# Patient Record
Sex: MALE | Race: WHITE | NOT HISPANIC OR LATINO | Employment: UNEMPLOYED | URBAN - METROPOLITAN AREA
[De-identification: names, ages, dates, MRNs, and addresses within clinical notes are randomized per-mention and may not be internally consistent; named-entity substitution may affect disease eponyms.]

---

## 2023-05-09 DIAGNOSIS — Z72.0 TOBACCO ABUSE: Primary | ICD-10-CM

## 2023-05-09 RX ORDER — MICONAZOLE NITRATE 2 %
CREAM (GRAM) TOPICAL
Qty: 30 EACH | Refills: 1 | Status: SHIPPED | OUTPATIENT
Start: 2023-05-09 | End: 2023-06-07

## 2023-06-28 ENCOUNTER — TELEPHONE (OUTPATIENT)
Dept: PRIMARY CARE | Facility: CLINIC | Age: 53
End: 2023-06-28

## 2023-08-29 PROCEDURE — 99222 1ST HOSP IP/OBS MODERATE 55: CPT | Performed by: INTERNAL MEDICINE

## 2023-08-30 PROCEDURE — 99232 SBSQ HOSP IP/OBS MODERATE 35: CPT | Performed by: INTERNAL MEDICINE

## 2023-08-31 PROCEDURE — 99232 SBSQ HOSP IP/OBS MODERATE 35: CPT | Performed by: INTERNAL MEDICINE

## 2023-09-01 PROCEDURE — 99238 HOSP IP/OBS DSCHRG MGMT 30/<: CPT | Performed by: INTERNAL MEDICINE

## 2023-10-02 ENCOUNTER — PHARMACY VISIT (OUTPATIENT)
Dept: PHARMACY | Facility: CLINIC | Age: 53
End: 2023-10-02
Payer: COMMERCIAL

## 2023-10-02 PROCEDURE — RXMED WILLOW AMBULATORY MEDICATION CHARGE

## 2023-10-10 ENCOUNTER — PATIENT OUTREACH (OUTPATIENT)
Dept: CASE MANAGEMENT | Facility: HOSPITAL | Age: 53
End: 2023-10-10

## 2023-10-10 NOTE — PROGRESS NOTES
Spoke with Cesario, reports that he is doing his best to manage HF. Follows low Na diet, takes medications as directed. Verbalizes understanding of HF medications & HF flare up symptoms to report to MD. No readmits for HF in past 39 days. Closing HF case today.

## 2023-10-24 ENCOUNTER — APPOINTMENT (OUTPATIENT)
Dept: PRIMARY CARE | Facility: CLINIC | Age: 53
End: 2023-10-24

## 2023-11-04 ENCOUNTER — PHARMACY VISIT (OUTPATIENT)
Dept: PHARMACY | Facility: CLINIC | Age: 53
End: 2023-11-04

## 2023-11-04 PROCEDURE — RXMED WILLOW AMBULATORY MEDICATION CHARGE

## 2023-11-30 ENCOUNTER — PHARMACY VISIT (OUTPATIENT)
Dept: PHARMACY | Facility: CLINIC | Age: 53
End: 2023-11-30
Payer: MEDICAID

## 2023-11-30 PROCEDURE — RXMED WILLOW AMBULATORY MEDICATION CHARGE

## 2023-12-01 DIAGNOSIS — Z72.0 TOBACCO ABUSE: ICD-10-CM

## 2023-12-01 RX ORDER — MICONAZOLE NITRATE 2 %
CREAM (GRAM) TOPICAL
Qty: 50 EACH | Refills: 1 | Status: SHIPPED | OUTPATIENT
Start: 2023-12-01 | End: 2024-06-07 | Stop reason: SDUPTHER

## 2023-12-02 ENCOUNTER — PHARMACY VISIT (OUTPATIENT)
Dept: PHARMACY | Facility: CLINIC | Age: 53
End: 2023-12-02
Payer: MEDICAID

## 2023-12-02 PROCEDURE — RXMED WILLOW AMBULATORY MEDICATION CHARGE

## 2024-01-08 PROCEDURE — RXMED WILLOW AMBULATORY MEDICATION CHARGE

## 2024-01-09 ENCOUNTER — TELEPHONE (OUTPATIENT)
Dept: PRIMARY CARE | Facility: CLINIC | Age: 54
End: 2024-01-09
Payer: MEDICAID

## 2024-01-19 ENCOUNTER — PHARMACY VISIT (OUTPATIENT)
Dept: PHARMACY | Facility: CLINIC | Age: 54
End: 2024-01-19
Payer: MEDICAID

## 2024-01-19 PROCEDURE — RXMED WILLOW AMBULATORY MEDICATION CHARGE

## 2024-01-19 RX ORDER — SERTRALINE HYDROCHLORIDE 50 MG/1
50 TABLET, FILM COATED ORAL DAILY
Qty: 30 TABLET | Refills: 1 | OUTPATIENT
Start: 2024-01-19 | End: 2025-01-18

## 2024-01-23 ENCOUNTER — PHARMACY VISIT (OUTPATIENT)
Dept: PHARMACY | Facility: CLINIC | Age: 54
End: 2024-01-23
Payer: MEDICAID

## 2024-06-07 ENCOUNTER — OFFICE VISIT (OUTPATIENT)
Dept: PRIMARY CARE | Facility: CLINIC | Age: 54
End: 2024-06-07
Payer: MEDICAID

## 2024-06-07 VITALS
DIASTOLIC BLOOD PRESSURE: 98 MMHG | HEIGHT: 70 IN | BODY MASS INDEX: 26.34 KG/M2 | WEIGHT: 184 LBS | SYSTOLIC BLOOD PRESSURE: 158 MMHG

## 2024-06-07 DIAGNOSIS — Z13.220 LIPID SCREENING: ICD-10-CM

## 2024-06-07 DIAGNOSIS — Z72.0 TOBACCO ABUSE: ICD-10-CM

## 2024-06-07 DIAGNOSIS — I50.9 CONGESTIVE HEART FAILURE, UNSPECIFIED HF CHRONICITY, UNSPECIFIED HEART FAILURE TYPE (MULTI): ICD-10-CM

## 2024-06-07 DIAGNOSIS — R53.83 OTHER FATIGUE: ICD-10-CM

## 2024-06-07 DIAGNOSIS — I10 HYPERTENSION, UNSPECIFIED TYPE: ICD-10-CM

## 2024-06-07 DIAGNOSIS — I63.9 CEREBROVASCULAR ACCIDENT (CVA), UNSPECIFIED MECHANISM (MULTI): ICD-10-CM

## 2024-06-07 DIAGNOSIS — E78.00 HIGH CHOLESTEROL: ICD-10-CM

## 2024-06-07 DIAGNOSIS — E55.9 VITAMIN D DEFICIENCY: ICD-10-CM

## 2024-06-07 DIAGNOSIS — R42 DIZZINESS: Primary | ICD-10-CM

## 2024-06-07 PROBLEM — F41.9 ANXIETY: Status: ACTIVE | Noted: 2024-06-07

## 2024-06-07 PROBLEM — M19.90 ARTHRITIS: Status: ACTIVE | Noted: 2024-06-07

## 2024-06-07 PROBLEM — H92.02 EARACHE ON LEFT: Status: ACTIVE | Noted: 2024-06-07

## 2024-06-07 PROBLEM — I42.0 CONGESTIVE CARDIOMYOPATHY (MULTI): Status: ACTIVE | Noted: 2024-06-07

## 2024-06-07 PROBLEM — J18.9 ATYPICAL PNEUMONIA: Status: ACTIVE | Noted: 2024-06-07

## 2024-06-07 PROBLEM — B19.20 HEPATITIS C VIRUS: Status: ACTIVE | Noted: 2024-06-07

## 2024-06-07 PROBLEM — B37.0 ORAL CANDIDIASIS: Status: ACTIVE | Noted: 2024-06-07

## 2024-06-07 PROBLEM — I35.1 AORTIC VALVE REGURGITATION: Status: ACTIVE | Noted: 2024-06-07

## 2024-06-07 PROBLEM — N28.9 KIDNEY LESION: Status: ACTIVE | Noted: 2024-06-07

## 2024-06-07 PROBLEM — K13.0 ANGULAR CHEILITIS: Status: ACTIVE | Noted: 2024-06-07

## 2024-06-07 PROBLEM — B36.0 PITYRIASIS VERSICOLOR: Status: ACTIVE | Noted: 2024-06-07

## 2024-06-07 PROBLEM — I34.0 MITRAL VALVE REGURGITATION: Status: ACTIVE | Noted: 2024-06-07

## 2024-06-07 PROBLEM — H93.13 TINNITUS OF BOTH EARS: Status: ACTIVE | Noted: 2024-06-07

## 2024-06-07 PROBLEM — R55 NEAR SYNCOPE: Status: ACTIVE | Noted: 2024-06-07

## 2024-06-07 PROBLEM — D17.0 LIPOMA OF FOREHEAD: Status: ACTIVE | Noted: 2024-06-07

## 2024-06-07 PROBLEM — R09.02 HYPOXIA: Status: ACTIVE | Noted: 2024-06-07

## 2024-06-07 PROBLEM — Z86.59 HISTORY OF ADHD: Status: ACTIVE | Noted: 2024-06-07

## 2024-06-07 PROBLEM — H52.4 BILATERAL PRESBYOPIA: Status: ACTIVE | Noted: 2024-06-07

## 2024-06-07 PROBLEM — R22.0 LUMP ON FACE: Status: ACTIVE | Noted: 2024-06-07

## 2024-06-07 PROBLEM — J20.9 ACUTE BRONCHITIS: Status: ACTIVE | Noted: 2024-06-07

## 2024-06-07 PROBLEM — H17.9 CORNEAL SCAR, RIGHT EYE: Status: ACTIVE | Noted: 2024-06-07

## 2024-06-07 PROBLEM — I81 PORTAL VEIN THROMBOSIS: Status: ACTIVE | Noted: 2024-06-07

## 2024-06-07 PROBLEM — R06.00 DYSPNEA: Status: ACTIVE | Noted: 2024-06-07

## 2024-06-07 PROBLEM — H90.3 SENSORINEURAL HEARING LOSS (SNHL) OF BOTH EARS: Status: ACTIVE | Noted: 2024-06-07

## 2024-06-07 PROBLEM — R05.9 COUGHING: Status: ACTIVE | Noted: 2024-06-07

## 2024-06-07 PROBLEM — I26.99: Status: ACTIVE | Noted: 2024-06-07

## 2024-06-07 PROCEDURE — 99214 OFFICE O/P EST MOD 30 MIN: CPT | Performed by: INTERNAL MEDICINE

## 2024-06-07 PROCEDURE — 3077F SYST BP >= 140 MM HG: CPT | Performed by: INTERNAL MEDICINE

## 2024-06-07 PROCEDURE — RXMED WILLOW AMBULATORY MEDICATION CHARGE

## 2024-06-07 PROCEDURE — 3080F DIAST BP >= 90 MM HG: CPT | Performed by: INTERNAL MEDICINE

## 2024-06-07 RX ORDER — MICONAZOLE NITRATE 2 %
CREAM (GRAM) TOPICAL
Qty: 50 EACH | Refills: 1 | Status: SHIPPED | OUTPATIENT
Start: 2024-06-07 | End: 2024-07-10

## 2024-06-08 NOTE — PROGRESS NOTES
"Subjective   Patient ID: Austin Gould is a 54 y.o. male who presents for Follow-up (Multiple medical issues).    This gentleman today came here for multiple medical issues.  Feeling very tired, fatigued, and exhausted.  He loses memory.  He has dizziness at times.  He came here for follow-up.  He sees cardiologist.  He has a heart failure.    I have personally reviewed the patient's Past Medical History, Medications, Allergies, Social History, and Family History in the EMR.    Review of Systems   All other systems reviewed and are negative.    Objective   BP (!) 158/98   Ht 1.778 m (5' 10\")   Wt 83.5 kg (184 lb)   BMI 26.40 kg/m²     Physical Exam  Vitals reviewed.   Cardiovascular:      Heart sounds: Normal heart sounds, S1 normal and S2 normal. No murmur heard.     No friction rub.   Pulmonary:      Effort: Pulmonary effort is normal.      Breath sounds: Normal breath sounds and air entry.   Abdominal:      Palpations: There is no hepatomegaly, splenomegaly or mass.   Musculoskeletal:      Right lower leg: No edema.      Left lower leg: No edema.   Lymphadenopathy:      Lower Body: No right inguinal adenopathy. No left inguinal adenopathy.   Neurological:      Cranial Nerves: Cranial nerves 2-12 are intact.      Sensory: No sensory deficit.      Motor: Motor function is intact.      Deep Tendon Reflexes: Reflexes are normal and symmetric.     LAB WORK:  Laboratory testing discussed.    Assessment/Plan   Problem List Items Addressed This Visit             ICD-10-CM       Cardiac and Vasculature    CHF (congestive heart failure) (Multi) I50.9    Relevant Orders    Referral to Cardiology       Endocrine/Metabolic    Vitamin D deficiency E55.9    Relevant Orders    Vitamin D 25-Hydroxy,Total (for eval of Vitamin D levels)       Symptoms and Signs    Dizziness - Primary R42     Other Visit Diagnoses         Codes    Lipid screening     Z13.220    Relevant Orders    Comprehensive Metabolic Panel    Lipid Panel    " Other fatigue     R53.83    Relevant Orders    CBC    Vitamin B12    Thyroid Stimulating Hormone    Urinalysis with Reflex Microscopic    Cerebrovascular accident (CVA), unspecified mechanism (Multi)     I63.9    Relevant Orders    MR brain wo IV contrast    Referral to Neurology    Hypertension, unspecified type     I10    High cholesterol     E78.00        1. Dizziness, headache and memory issue.  Refer to Neurology.  Immediate MRI ordered.  It could be multi-infract kind of dementia setting in.  2. Congestive heart failure.  No pacemaker.  I see cardiologist.  3. High blood pressure, okay.  4. High cholesterol.  Monitor.  5. Blood work ordered.  6. I shall see him back in a week after the test.  7. I urged him to bring the medication for review.  8. Make appointment with Neurology and Cardiology.    Scribe Attestation  By signing my name below, I, Sanjuana Serna, Scribe attest that this documentation has been prepared under the direction and in the presence of Parth Couch MD.

## 2024-06-17 ENCOUNTER — LAB (OUTPATIENT)
Dept: LAB | Facility: LAB | Age: 54
End: 2024-06-17
Payer: MEDICAID

## 2024-06-17 DIAGNOSIS — E55.9 VITAMIN D DEFICIENCY: ICD-10-CM

## 2024-06-17 DIAGNOSIS — R53.83 OTHER FATIGUE: ICD-10-CM

## 2024-06-17 DIAGNOSIS — Z13.220 LIPID SCREENING: ICD-10-CM

## 2024-06-17 LAB
ALBUMIN SERPL BCP-MCNC: 4.7 G/DL (ref 3.4–5)
ALP SERPL-CCNC: 70 U/L (ref 33–120)
ALT SERPL W P-5'-P-CCNC: 17 U/L (ref 10–52)
ANION GAP SERPL CALC-SCNC: 14 MMOL/L (ref 10–20)
AST SERPL W P-5'-P-CCNC: 17 U/L (ref 9–39)
BILIRUB SERPL-MCNC: 0.7 MG/DL (ref 0–1.2)
BUN SERPL-MCNC: 12 MG/DL (ref 6–23)
CALCIUM SERPL-MCNC: 9.7 MG/DL (ref 8.6–10.6)
CHLORIDE SERPL-SCNC: 103 MMOL/L (ref 98–107)
CHOLEST SERPL-MCNC: 151 MG/DL (ref 0–199)
CHOLESTEROL/HDL RATIO: 4.4
CO2 SERPL-SCNC: 28 MMOL/L (ref 21–32)
CREAT SERPL-MCNC: 1.16 MG/DL (ref 0.5–1.3)
EGFRCR SERPLBLD CKD-EPI 2021: 76 ML/MIN/1.73M*2
ERYTHROCYTE [DISTWIDTH] IN BLOOD BY AUTOMATED COUNT: 12.3 % (ref 11.5–14.5)
GLUCOSE SERPL-MCNC: 93 MG/DL (ref 74–99)
HCT VFR BLD AUTO: 52.6 % (ref 41–52)
HDLC SERPL-MCNC: 34.1 MG/DL
HGB BLD-MCNC: 17.8 G/DL (ref 13.5–17.5)
LDLC SERPL CALC-MCNC: 84 MG/DL
MCH RBC QN AUTO: 32.8 PG (ref 26–34)
MCHC RBC AUTO-ENTMCNC: 33.8 G/DL (ref 32–36)
MCV RBC AUTO: 97 FL (ref 80–100)
NON HDL CHOLESTEROL: 117 MG/DL (ref 0–149)
NRBC BLD-RTO: 0 /100 WBCS (ref 0–0)
PLATELET # BLD AUTO: 219 X10*3/UL (ref 150–450)
POTASSIUM SERPL-SCNC: 4.2 MMOL/L (ref 3.5–5.3)
PROT SERPL-MCNC: 6.9 G/DL (ref 6.4–8.2)
RBC # BLD AUTO: 5.42 X10*6/UL (ref 4.5–5.9)
SODIUM SERPL-SCNC: 141 MMOL/L (ref 136–145)
TRIGL SERPL-MCNC: 167 MG/DL (ref 0–149)
VLDL: 33 MG/DL (ref 0–40)
WBC # BLD AUTO: 6.7 X10*3/UL (ref 4.4–11.3)

## 2024-06-17 PROCEDURE — 80061 LIPID PANEL: CPT

## 2024-06-17 PROCEDURE — 80053 COMPREHEN METABOLIC PANEL: CPT

## 2024-06-17 PROCEDURE — 82306 VITAMIN D 25 HYDROXY: CPT

## 2024-06-17 PROCEDURE — 81001 URINALYSIS AUTO W/SCOPE: CPT

## 2024-06-17 PROCEDURE — 85027 COMPLETE CBC AUTOMATED: CPT

## 2024-06-17 PROCEDURE — 82607 VITAMIN B-12: CPT

## 2024-06-17 PROCEDURE — 84443 ASSAY THYROID STIM HORMONE: CPT

## 2024-06-17 PROCEDURE — 36415 COLL VENOUS BLD VENIPUNCTURE: CPT

## 2024-06-18 ENCOUNTER — PHARMACY VISIT (OUTPATIENT)
Dept: PHARMACY | Facility: CLINIC | Age: 54
End: 2024-06-18
Payer: MEDICAID

## 2024-06-18 LAB
25(OH)D3 SERPL-MCNC: 30 NG/ML (ref 30–100)
APPEARANCE UR: CLEAR
BILIRUB UR STRIP.AUTO-MCNC: NEGATIVE MG/DL
COLOR UR: YELLOW
GLUCOSE UR STRIP.AUTO-MCNC: NORMAL MG/DL
KETONES UR STRIP.AUTO-MCNC: NEGATIVE MG/DL
LEUKOCYTE ESTERASE UR QL STRIP.AUTO: NEGATIVE
MUCOUS THREADS #/AREA URNS AUTO: NORMAL /LPF
NITRITE UR QL STRIP.AUTO: NEGATIVE
PH UR STRIP.AUTO: 6 [PH]
PROT UR STRIP.AUTO-MCNC: NORMAL MG/DL
RBC # UR STRIP.AUTO: NEGATIVE /UL
RBC #/AREA URNS AUTO: NORMAL /HPF
SP GR UR STRIP.AUTO: 1.02
TSH SERPL-ACNC: 2.66 MIU/L (ref 0.44–3.98)
UROBILINOGEN UR STRIP.AUTO-MCNC: NORMAL MG/DL
VIT B12 SERPL-MCNC: 270 PG/ML (ref 211–911)
WBC #/AREA URNS AUTO: NORMAL /HPF

## 2024-06-24 ENCOUNTER — TELEPHONE (OUTPATIENT)
Dept: PRIMARY CARE | Facility: CLINIC | Age: 54
End: 2024-06-24
Payer: MEDICAID

## 2024-07-01 DIAGNOSIS — Z72.0 TOBACCO ABUSE: ICD-10-CM

## 2024-07-01 PROCEDURE — RXMED WILLOW AMBULATORY MEDICATION CHARGE

## 2024-07-01 RX ORDER — MICONAZOLE NITRATE 2 %
CREAM (GRAM) TOPICAL
Qty: 50 EACH | Refills: 1 | Status: SHIPPED | OUTPATIENT
Start: 2024-07-01 | End: 2024-07-09 | Stop reason: SDUPTHER

## 2024-07-02 ENCOUNTER — PHARMACY VISIT (OUTPATIENT)
Dept: PHARMACY | Facility: CLINIC | Age: 54
End: 2024-07-02
Payer: MEDICAID

## 2024-07-09 DIAGNOSIS — R06.02 SOB (SHORTNESS OF BREATH): Primary | ICD-10-CM

## 2024-07-09 DIAGNOSIS — Z72.0 TOBACCO ABUSE: ICD-10-CM

## 2024-07-09 PROCEDURE — RXMED WILLOW AMBULATORY MEDICATION CHARGE

## 2024-07-09 RX ORDER — MICONAZOLE NITRATE 2 %
CREAM (GRAM) TOPICAL
Qty: 50 EACH | Refills: 1 | Status: SHIPPED | OUTPATIENT
Start: 2024-07-09 | End: 2024-08-08

## 2024-07-09 RX ORDER — ALBUTEROL SULFATE 90 UG/1
2 AEROSOL, METERED RESPIRATORY (INHALATION)
Qty: 6.7 G | Refills: 0 | Status: SHIPPED | OUTPATIENT
Start: 2024-07-09 | End: 2025-07-09

## 2024-07-10 ENCOUNTER — PHARMACY VISIT (OUTPATIENT)
Dept: PHARMACY | Facility: CLINIC | Age: 54
End: 2024-07-10
Payer: MEDICAID

## 2024-07-17 ENCOUNTER — APPOINTMENT (OUTPATIENT)
Dept: RADIOLOGY | Facility: CLINIC | Age: 54
End: 2024-07-17
Payer: MEDICAID

## 2024-07-22 DIAGNOSIS — Z72.0 TOBACCO ABUSE: ICD-10-CM

## 2024-07-22 PROCEDURE — RXMED WILLOW AMBULATORY MEDICATION CHARGE

## 2024-07-22 RX ORDER — MICONAZOLE NITRATE 2 %
CREAM (GRAM) TOPICAL
Qty: 50 EACH | Refills: 1 | Status: SHIPPED | OUTPATIENT
Start: 2024-07-22 | End: 2024-07-31 | Stop reason: SDUPTHER

## 2024-07-23 ENCOUNTER — PHARMACY VISIT (OUTPATIENT)
Dept: PHARMACY | Facility: CLINIC | Age: 54
End: 2024-07-23
Payer: MEDICAID

## 2024-07-31 ENCOUNTER — APPOINTMENT (OUTPATIENT)
Dept: RADIOLOGY | Facility: CLINIC | Age: 54
End: 2024-07-31
Payer: MEDICAID

## 2024-07-31 DIAGNOSIS — Z72.0 TOBACCO ABUSE: ICD-10-CM

## 2024-07-31 PROCEDURE — RXMED WILLOW AMBULATORY MEDICATION CHARGE

## 2024-07-31 RX ORDER — MICONAZOLE NITRATE 2 %
CREAM (GRAM) TOPICAL
Qty: 50 EACH | Refills: 1 | Status: SHIPPED | OUTPATIENT
Start: 2024-07-31 | End: 2024-08-30

## 2024-08-03 ENCOUNTER — PHARMACY VISIT (OUTPATIENT)
Dept: PHARMACY | Facility: CLINIC | Age: 54
End: 2024-08-03
Payer: MEDICAID

## 2024-08-09 ENCOUNTER — LAB (OUTPATIENT)
Dept: LAB | Facility: LAB | Age: 54
End: 2024-08-09
Payer: MEDICAID

## 2024-08-09 ENCOUNTER — OFFICE VISIT (OUTPATIENT)
Dept: PRIMARY CARE | Facility: CLINIC | Age: 54
End: 2024-08-09
Payer: MEDICAID

## 2024-08-09 VITALS
BODY MASS INDEX: 25.91 KG/M2 | SYSTOLIC BLOOD PRESSURE: 142 MMHG | WEIGHT: 181 LBS | DIASTOLIC BLOOD PRESSURE: 84 MMHG | HEIGHT: 70 IN

## 2024-08-09 DIAGNOSIS — F32.A ANXIETY AND DEPRESSION: ICD-10-CM

## 2024-08-09 DIAGNOSIS — I10 HYPERTENSION, UNSPECIFIED TYPE: ICD-10-CM

## 2024-08-09 DIAGNOSIS — R06.02 SOB (SHORTNESS OF BREATH): ICD-10-CM

## 2024-08-09 DIAGNOSIS — R53.83 OTHER FATIGUE: ICD-10-CM

## 2024-08-09 DIAGNOSIS — F41.9 ANXIETY AND DEPRESSION: ICD-10-CM

## 2024-08-09 DIAGNOSIS — R60.9 EDEMA, UNSPECIFIED TYPE: ICD-10-CM

## 2024-08-09 DIAGNOSIS — K21.9 GASTROESOPHAGEAL REFLUX DISEASE, UNSPECIFIED WHETHER ESOPHAGITIS PRESENT: ICD-10-CM

## 2024-08-09 DIAGNOSIS — I50.9 CONGESTIVE HEART FAILURE, UNSPECIFIED HF CHRONICITY, UNSPECIFIED HEART FAILURE TYPE (MULTI): Primary | ICD-10-CM

## 2024-08-09 PROBLEM — Z95.0 PRESENCE OF CARDIAC PACEMAKER: Status: ACTIVE | Noted: 2024-08-09

## 2024-08-09 PROBLEM — R20.2 NUMBNESS AND TINGLING SENSATION OF SKIN: Status: ACTIVE | Noted: 2024-08-09

## 2024-08-09 PROBLEM — R25.2 CRAMPING OF HANDS: Status: ACTIVE | Noted: 2024-08-09

## 2024-08-09 PROBLEM — E66.3 OVERWEIGHT WITH BODY MASS INDEX (BMI) 25.0-29.9: Status: ACTIVE | Noted: 2024-08-09

## 2024-08-09 PROBLEM — R20.0 NUMBNESS AND TINGLING SENSATION OF SKIN: Status: ACTIVE | Noted: 2024-08-09

## 2024-08-09 LAB
ALBUMIN SERPL BCP-MCNC: 4.9 G/DL (ref 3.4–5)
ALP SERPL-CCNC: 62 U/L (ref 33–120)
ALT SERPL W P-5'-P-CCNC: 21 U/L (ref 10–52)
ANION GAP SERPL CALC-SCNC: 17 MMOL/L (ref 10–20)
AST SERPL W P-5'-P-CCNC: 25 U/L (ref 9–39)
BILIRUB SERPL-MCNC: 1 MG/DL (ref 0–1.2)
BUN SERPL-MCNC: 19 MG/DL (ref 6–23)
CALCIUM SERPL-MCNC: 9.7 MG/DL (ref 8.6–10.6)
CHLORIDE SERPL-SCNC: 104 MMOL/L (ref 98–107)
CHOLEST SERPL-MCNC: 128 MG/DL (ref 0–199)
CHOLESTEROL/HDL RATIO: 2.6
CO2 SERPL-SCNC: 25 MMOL/L (ref 21–32)
CREAT SERPL-MCNC: 1.22 MG/DL (ref 0.5–1.3)
EGFRCR SERPLBLD CKD-EPI 2021: 71 ML/MIN/1.73M*2
ERYTHROCYTE [DISTWIDTH] IN BLOOD BY AUTOMATED COUNT: 12.2 % (ref 11.5–14.5)
GLUCOSE SERPL-MCNC: 93 MG/DL (ref 74–99)
HCT VFR BLD AUTO: 45.7 % (ref 41–52)
HDLC SERPL-MCNC: 49.8 MG/DL
HGB BLD-MCNC: 15.7 G/DL (ref 13.5–17.5)
MCH RBC QN AUTO: 32.8 PG (ref 26–34)
MCHC RBC AUTO-ENTMCNC: 34.4 G/DL (ref 32–36)
MCV RBC AUTO: 96 FL (ref 80–100)
NON-HDL CHOLESTEROL: 78 MG/DL (ref 0–149)
NRBC BLD-RTO: 0 /100 WBCS (ref 0–0)
PLATELET # BLD AUTO: 175 X10*3/UL (ref 150–450)
POTASSIUM SERPL-SCNC: 4.2 MMOL/L (ref 3.5–5.3)
PROT SERPL-MCNC: 6.7 G/DL (ref 6.4–8.2)
RBC # BLD AUTO: 4.78 X10*6/UL (ref 4.5–5.9)
SODIUM SERPL-SCNC: 142 MMOL/L (ref 136–145)
TSH SERPL-ACNC: 3.07 MIU/L (ref 0.44–3.98)
WBC # BLD AUTO: 7.1 X10*3/UL (ref 4.4–11.3)

## 2024-08-09 PROCEDURE — RXMED WILLOW AMBULATORY MEDICATION CHARGE

## 2024-08-09 PROCEDURE — 80053 COMPREHEN METABOLIC PANEL: CPT

## 2024-08-09 PROCEDURE — 3079F DIAST BP 80-89 MM HG: CPT | Performed by: INTERNAL MEDICINE

## 2024-08-09 PROCEDURE — 82465 ASSAY BLD/SERUM CHOLESTEROL: CPT

## 2024-08-09 PROCEDURE — 36415 COLL VENOUS BLD VENIPUNCTURE: CPT

## 2024-08-09 PROCEDURE — 84443 ASSAY THYROID STIM HORMONE: CPT

## 2024-08-09 PROCEDURE — 3008F BODY MASS INDEX DOCD: CPT | Performed by: INTERNAL MEDICINE

## 2024-08-09 PROCEDURE — 83718 ASSAY OF LIPOPROTEIN: CPT

## 2024-08-09 PROCEDURE — 3077F SYST BP >= 140 MM HG: CPT | Performed by: INTERNAL MEDICINE

## 2024-08-09 PROCEDURE — 85027 COMPLETE CBC AUTOMATED: CPT

## 2024-08-09 PROCEDURE — 99214 OFFICE O/P EST MOD 30 MIN: CPT | Performed by: INTERNAL MEDICINE

## 2024-08-09 PROCEDURE — 81001 URINALYSIS AUTO W/SCOPE: CPT

## 2024-08-09 RX ORDER — ALBUTEROL SULFATE 90 UG/1
2 INHALANT RESPIRATORY (INHALATION)
Qty: 6.7 G | Refills: 0 | Status: SHIPPED | OUTPATIENT
Start: 2024-08-09 | End: 2025-08-09

## 2024-08-09 RX ORDER — FUROSEMIDE 40 MG/1
40 TABLET ORAL DAILY
Qty: 30 TABLET | Refills: 6 | Status: SHIPPED | OUTPATIENT
Start: 2024-08-09 | End: 2025-08-09

## 2024-08-09 RX ORDER — ASPIRIN 81 MG/1
81 TABLET ORAL DAILY
Qty: 30 TABLET | Refills: 0 | Status: SHIPPED | OUTPATIENT
Start: 2024-08-09 | End: 2025-08-09

## 2024-08-09 RX ORDER — POTASSIUM CHLORIDE 20 MEQ/1
TABLET, EXTENDED RELEASE ORAL
Qty: 30 TABLET | Refills: 6 | Status: SHIPPED | OUTPATIENT
Start: 2024-08-09 | End: 2025-08-09

## 2024-08-09 RX ORDER — SERTRALINE HYDROCHLORIDE 50 MG/1
50 TABLET, FILM COATED ORAL DAILY
Qty: 30 TABLET | Refills: 1 | Status: SHIPPED | OUTPATIENT
Start: 2024-08-09 | End: 2025-08-09

## 2024-08-09 RX ORDER — CARVEDILOL 25 MG/1
25 TABLET ORAL
Qty: 60 TABLET | Refills: 6 | Status: SHIPPED | OUTPATIENT
Start: 2024-08-09 | End: 2025-08-09

## 2024-08-09 RX ORDER — SPIRONOLACTONE 25 MG/1
25 TABLET ORAL DAILY
Qty: 30 TABLET | Refills: 6 | Status: SHIPPED | OUTPATIENT
Start: 2024-08-09 | End: 2025-08-09

## 2024-08-10 LAB
AMORPH CRY #/AREA UR COMP ASSIST: ABNORMAL /HPF
APPEARANCE UR: ABNORMAL
BILIRUB UR STRIP.AUTO-MCNC: NEGATIVE MG/DL
COLOR UR: ABNORMAL
GLUCOSE UR STRIP.AUTO-MCNC: NORMAL MG/DL
KETONES UR STRIP.AUTO-MCNC: NEGATIVE MG/DL
LEUKOCYTE ESTERASE UR QL STRIP.AUTO: NEGATIVE
MUCOUS THREADS #/AREA URNS AUTO: ABNORMAL /LPF
NITRITE UR QL STRIP.AUTO: NEGATIVE
PH UR STRIP.AUTO: 5.5 [PH]
PROT UR STRIP.AUTO-MCNC: ABNORMAL MG/DL
RBC # UR STRIP.AUTO: NEGATIVE /UL
RBC #/AREA URNS AUTO: ABNORMAL /HPF
SP GR UR STRIP.AUTO: 1.03
SQUAMOUS #/AREA URNS AUTO: ABNORMAL /HPF
UROBILINOGEN UR STRIP.AUTO-MCNC: NORMAL MG/DL
WBC #/AREA URNS AUTO: ABNORMAL /HPF

## 2024-08-10 NOTE — PROGRESS NOTES
"Subjective   Patient ID: Austin Gould is a 54 y.o. male who presents for Follow-up and Terminal heart disease.    This gentleman today came here for follow-up on various conditions.  He told me that he knows he has terminal heart disease, but he stopped taking all medications.  He is not smoking.  He chews tobacco.    I have personally reviewed the patient's Past Medical History, Medications, Allergies, Social History, and Family History in the EMR.    Review of Systems   All other systems reviewed and are negative.    Objective   /84   Ht 1.778 m (5' 10\")   Wt 82.1 kg (181 lb)   BMI 25.97 kg/m²     Physical Exam  Vitals reviewed.   Cardiovascular:      Heart sounds: Normal heart sounds, S1 normal and S2 normal. No murmur heard.     No friction rub.   Pulmonary:      Effort: Pulmonary effort is normal.      Breath sounds: Wheezing (bilateral) present.   Abdominal:      Palpations: There is no hepatomegaly, splenomegaly or mass.   Musculoskeletal:      Right lower leg: No edema.      Left lower leg: No edema.   Lymphadenopathy:      Lower Body: No right inguinal adenopathy. No left inguinal adenopathy.   Neurological:      Cranial Nerves: Cranial nerves 2-12 are intact.      Sensory: No sensory deficit.      Motor: Motor function is intact.      Deep Tendon Reflexes: Reflexes are normal and symmetric.     LAB WORK: Laboratory testing discussed.    Assessment/Plan   Problem List Items Addressed This Visit             ICD-10-CM       Cardiac and Vasculature    CHF (congestive heart failure) (Multi) - Primary I50.9    Relevant Medications    sacubitriL-valsartan (Entresto) 24-26 mg tablet    carvedilol (Coreg) 25 mg tablet     Other Visit Diagnoses         Codes    SOB (shortness of breath)     R06.02    Relevant Medications    potassium chloride CR (Klor-Con M20) 20 mEq ER tablet    sacubitriL-valsartan (Entresto) 24-26 mg tablet    albuterol 90 mcg/actuation inhaler    carvedilol (Coreg) 25 mg tablet    " spironolactone (Aldactone) 25 mg tablet    furosemide (Lasix) 40 mg tablet    sertraline (Zoloft) 50 mg tablet    aspirin 81 mg EC tablet    Other Relevant Orders    Referral to Cardiology    Other fatigue     R53.83    Relevant Orders    Thyroid Stimulating Hormone    Hypertension, unspecified type     I10    Relevant Orders    Comprehensive Metabolic Panel    CBC    Urinalysis with Reflex Microscopic    Lipid Panel Non-Fasting    Edema, unspecified type     R60.9    Anxiety and depression     F41.9, F32.A    Gastroesophageal reflux disease, unspecified whether esophagitis present     K21.9        1. Congestive heart failure terminal.  Medication started.  I told him, we might have to admit.  Follow-up appointment with cardiologist right away.  2. Edema.  Lasix, potassium.  3. Anxiety and depression.  Sertraline.  4. GERD, on PPI.  5. Follow-up in a week after tests.  6. Continue to follow.    Scribe Attestation  By signing my name below, IDeirdre Scribe attest that this documentation has been prepared under the direction and in the presence of Parth Couch MD.

## 2024-08-12 ENCOUNTER — PHARMACY VISIT (OUTPATIENT)
Dept: PHARMACY | Facility: CLINIC | Age: 54
End: 2024-08-12
Payer: MEDICAID

## 2024-08-12 DIAGNOSIS — Z72.0 TOBACCO ABUSE: ICD-10-CM

## 2024-08-12 PROCEDURE — RXMED WILLOW AMBULATORY MEDICATION CHARGE

## 2024-08-12 RX ORDER — MICONAZOLE NITRATE 2 %
CREAM (GRAM) TOPICAL
Qty: 50 EACH | Refills: 1 | Status: SHIPPED | OUTPATIENT
Start: 2024-08-12 | End: 2024-08-22 | Stop reason: SDUPTHER

## 2024-08-13 ENCOUNTER — PHARMACY VISIT (OUTPATIENT)
Dept: PHARMACY | Facility: CLINIC | Age: 54
End: 2024-08-13
Payer: MEDICAID

## 2024-08-22 DIAGNOSIS — Z72.0 TOBACCO ABUSE: ICD-10-CM

## 2024-08-22 PROCEDURE — RXMED WILLOW AMBULATORY MEDICATION CHARGE

## 2024-08-22 RX ORDER — MICONAZOLE NITRATE 2 %
CREAM (GRAM) TOPICAL
Qty: 50 EACH | Refills: 1 | Status: SHIPPED | OUTPATIENT
Start: 2024-08-22 | End: 2024-09-21

## 2024-08-23 ENCOUNTER — PHARMACY VISIT (OUTPATIENT)
Dept: PHARMACY | Facility: CLINIC | Age: 54
End: 2024-08-23
Payer: MEDICAID

## 2024-09-06 DIAGNOSIS — Z72.0 TOBACCO ABUSE: ICD-10-CM

## 2024-09-06 PROCEDURE — RXMED WILLOW AMBULATORY MEDICATION CHARGE

## 2024-09-06 RX ORDER — MICONAZOLE NITRATE 2 %
CREAM (GRAM) TOPICAL
Qty: 50 EACH | Refills: 1 | Status: SHIPPED | OUTPATIENT
Start: 2024-09-06 | End: 2024-10-06

## 2024-09-12 ENCOUNTER — PHARMACY VISIT (OUTPATIENT)
Dept: PHARMACY | Facility: CLINIC | Age: 54
End: 2024-09-12
Payer: MEDICAID

## 2024-09-17 ENCOUNTER — APPOINTMENT (OUTPATIENT)
Dept: OPHTHALMOLOGY | Facility: CLINIC | Age: 54
End: 2024-09-17
Payer: MEDICAID

## 2024-09-17 DIAGNOSIS — H52.03 HYPEROPIA, BILATERAL: ICD-10-CM

## 2024-09-17 DIAGNOSIS — H52.4 PRESBYOPIA: ICD-10-CM

## 2024-09-17 DIAGNOSIS — H04.129 DRY EYE: Primary | ICD-10-CM

## 2024-09-17 PROCEDURE — 92014 COMPRE OPH EXAM EST PT 1/>: CPT | Performed by: OPHTHALMOLOGY

## 2024-09-17 PROCEDURE — 92015 DETERMINE REFRACTIVE STATE: CPT | Performed by: OPHTHALMOLOGY

## 2024-09-17 ASSESSMENT — REFRACTION_MANIFEST
OS_ADD: +2.50
OS_SPHERE: +0.50
OD_ADD: +2.50
OD_SPHERE: +0.50

## 2024-09-17 ASSESSMENT — EXTERNAL EXAM - LEFT EYE: OS_EXAM: NORMAL

## 2024-09-17 ASSESSMENT — EXTERNAL EXAM - RIGHT EYE: OD_EXAM: NORMAL

## 2024-09-17 ASSESSMENT — REFRACTION_WEARINGRX
OD_SPHERE: -0.25
OS_ADD: +2.25
OS_SPHERE: -0.50
OD_ADD: +2.25

## 2024-09-17 ASSESSMENT — TONOMETRY
IOP_METHOD: GOLDMANN APPLANATION
OD_IOP_MMHG: 13
OS_IOP_MMHG: 13

## 2024-09-17 ASSESSMENT — SLIT LAMP EXAM - LIDS
COMMENTS: GOOD POSITION
COMMENTS: GOOD POSITION

## 2024-09-17 ASSESSMENT — CUP TO DISC RATIO
OD_RATIO: .3
OS_RATIO: .3

## 2024-09-17 NOTE — PROGRESS NOTES
Assessment/Plan   Diagnoses and all orders for this visit:  Dry eye  recommend use of artificial tears 4 times a day, may use gel or ointment at night if symptoms are present in the morning.   Hyperopia, bilateral  Presbyopia  Glasses prescription given to patient today

## 2024-09-23 DIAGNOSIS — Z72.0 TOBACCO ABUSE: ICD-10-CM

## 2024-09-23 PROCEDURE — RXMED WILLOW AMBULATORY MEDICATION CHARGE

## 2024-09-23 RX ORDER — MICONAZOLE NITRATE 2 %
CREAM (GRAM) TOPICAL
Qty: 50 EACH | Refills: 1 | Status: SHIPPED | OUTPATIENT
Start: 2024-09-23 | End: 2024-10-23

## 2024-09-25 ENCOUNTER — PHARMACY VISIT (OUTPATIENT)
Dept: PHARMACY | Facility: CLINIC | Age: 54
End: 2024-09-25
Payer: MEDICAID

## 2024-09-25 DIAGNOSIS — R06.02 SOB (SHORTNESS OF BREATH): ICD-10-CM

## 2024-09-25 PROCEDURE — RXMED WILLOW AMBULATORY MEDICATION CHARGE

## 2024-09-25 RX ORDER — ALBUTEROL SULFATE 90 UG/1
2 INHALANT RESPIRATORY (INHALATION)
Qty: 6.7 G | Refills: 0 | Status: SHIPPED | OUTPATIENT
Start: 2024-09-25 | End: 2025-09-25

## 2024-09-25 RX ORDER — ASPIRIN 81 MG/1
81 TABLET ORAL DAILY
Qty: 30 TABLET | Refills: 0 | Status: SHIPPED | OUTPATIENT
Start: 2024-09-25 | End: 2025-09-25

## 2024-10-03 ENCOUNTER — PHARMACY VISIT (OUTPATIENT)
Dept: PHARMACY | Facility: CLINIC | Age: 54
End: 2024-10-03
Payer: MEDICAID

## 2024-10-07 ENCOUNTER — OFFICE VISIT (OUTPATIENT)
Dept: CARDIOLOGY | Facility: CLINIC | Age: 54
End: 2024-10-07
Payer: MEDICAID

## 2024-10-07 VITALS
SYSTOLIC BLOOD PRESSURE: 118 MMHG | BODY MASS INDEX: 25.8 KG/M2 | HEART RATE: 100 BPM | OXYGEN SATURATION: 97 % | HEIGHT: 70 IN | WEIGHT: 180.19 LBS | DIASTOLIC BLOOD PRESSURE: 86 MMHG

## 2024-10-07 DIAGNOSIS — R06.02 SOB (SHORTNESS OF BREATH): ICD-10-CM

## 2024-10-07 DIAGNOSIS — Z72.0 TOBACCO ABUSE: ICD-10-CM

## 2024-10-07 PROCEDURE — RXMED WILLOW AMBULATORY MEDICATION CHARGE

## 2024-10-07 PROCEDURE — 99214 OFFICE O/P EST MOD 30 MIN: CPT | Performed by: INTERNAL MEDICINE

## 2024-10-07 PROCEDURE — 3008F BODY MASS INDEX DOCD: CPT | Performed by: INTERNAL MEDICINE

## 2024-10-07 RX ORDER — MICONAZOLE NITRATE 2 %
CREAM (GRAM) TOPICAL
Qty: 50 EACH | Refills: 1 | Status: SHIPPED | OUTPATIENT
Start: 2024-10-07 | End: 2024-11-06

## 2024-10-07 ASSESSMENT — ENCOUNTER SYMPTOMS
OCCASIONAL FEELINGS OF UNSTEADINESS: 0
DEPRESSION: 1
LOSS OF SENSATION IN FEET: 0

## 2024-10-09 ENCOUNTER — PHARMACY VISIT (OUTPATIENT)
Dept: PHARMACY | Facility: CLINIC | Age: 54
End: 2024-10-09
Payer: MEDICAID

## 2024-10-13 NOTE — PROGRESS NOTES
"Primary Care Physician: Parth Couch MD  Date of Visit: 10/07/2024  3:15 PM EDT  Location of visit: [facility] [place] [address]     Chief Complaint: No chief complaint on file.    HPI / Summary:   Austin Gould is a 54 y.o. male presents follow-up    ROS    Medical History:   He has a past medical history of Body mass index (BMI) 26.0-26.9, adult (05/22/2020) and Personal history of other mental and behavioral disorders.  Surgical Hx:   He has no past surgical history on file.   Social Hx:   He reports that he quit smoking about 4 years ago. His smoking use included cigarettes. His smokeless tobacco use includes chew. He reports that he does not drink alcohol and does not use drugs.  Family Hx:   His family history is not on file.   Allergies:  No Known Allergies  Outpatient Medications:  Current Outpatient Medications   Medication Instructions    albuterol 90 mcg/actuation inhaler INHALE 2 PUFFS BY MOUTH FOUR TIMES A DAY    aspirin 81 mg EC tablet TAKE 1 TABLET BY MOUTH ONE TIME DAILY    carvedilol (Coreg) 25 mg tablet TAKE 1 TABLET BY MOUTH TWO TIMES A DAY WITH OR AFTER A MEAL    furosemide (Lasix) 40 mg tablet TAKE 1 TABLET BY MOUTH ONE TIME DAILY    nicotine polacrilex (Nicorette) 2 mg gum CHEW ONE PIECE SLOWLY AND INTERMITTENTLY FOR 30 MINUTES. REPEAT EVERY 1 TO 2 HOURS. MAXIMUM OF 24 PIECES/DAY    potassium chloride CR (Klor-Con M20) 20 mEq ER tablet TAKE 1 TABLET BY MOUTH ONE TIME DAILY WITH BREAKFAST AND A FULL GLASS OF WATER    sacubitriL-valsartan (Entresto) 24-26 mg tablet TAKE 1 TABLET BY MOUTH TWO TIMES A DAY    sertraline (Zoloft) 50 mg tablet TAKE 1 TABLET BY MOUTH ONE TIME DAILY    spironolactone (Aldactone) 25 mg tablet TAKE 1 TABLET BY MOUTH ONE TIME DAILY     Physical Exam:  Vitals:    10/07/24 1508 10/07/24 1538   BP: 146/87 118/86   BP Location: Left arm    Patient Position: Sitting    Pulse: 109 100   SpO2: 97%    Weight: 81.7 kg (180 lb 3 oz)    Height: 1.778 m (5' 10\")      Wt Readings from Last 5 " Encounters:   10/07/24 81.7 kg (180 lb 3 oz)   08/09/24 82.1 kg (181 lb)   06/07/24 83.5 kg (184 lb)   09/08/22 86.2 kg (190 lb)   08/22/22 89 kg (196 lb 2 oz)     Physical Exam  JVP not elevated. Carotid impulses are 2+ without overlying bruit.   Chest exhibits fair to good air movement with completely clear breath sounds.   The cardiac rhythm is regular with no premature beats.   Normal S1 and S2. No gallop, murmur or rub, or click.   Abdomen is soft and benign without focal tenderness.   With no lower leg edema. The pedal pulses are intact.     Last Labs:  Lab on 08/09/2024   Component Date Value    Glucose 08/09/2024 93     Sodium 08/09/2024 142     Potassium 08/09/2024 4.2     Chloride 08/09/2024 104     Bicarbonate 08/09/2024 25     Anion Gap 08/09/2024 17     Urea Nitrogen 08/09/2024 19     Creatinine 08/09/2024 1.22     eGFR 08/09/2024 71     Calcium 08/09/2024 9.7     Albumin 08/09/2024 4.9     Alkaline Phosphatase 08/09/2024 62     Total Protein 08/09/2024 6.7     AST 08/09/2024 25     Bilirubin, Total 08/09/2024 1.0     ALT 08/09/2024 21     WBC 08/09/2024 7.1     nRBC 08/09/2024 0.0     RBC 08/09/2024 4.78     Hemoglobin 08/09/2024 15.7     Hematocrit 08/09/2024 45.7     MCV 08/09/2024 96     MCH 08/09/2024 32.8     MCHC 08/09/2024 34.4     RDW 08/09/2024 12.2     Platelets 08/09/2024 175     Thyroid Stimulating Horm* 08/09/2024 3.07     Cholesterol 08/09/2024 128     HDL-Cholesterol 08/09/2024 49.8     Cholesterol/HDL Ratio 08/09/2024 2.6     Non-HDL Cholesterol 08/09/2024 78     Color, Urine 08/09/2024 Light-Peach (N)     Appearance, Urine 08/09/2024 Ex.Turbid (N)     Specific Gravity, Urine 08/09/2024 1.029     pH, Urine 08/09/2024 5.5     Protein, Urine 08/09/2024 30 (1+) (A)     Glucose, Urine 08/09/2024 Normal     Blood, Urine 08/09/2024 NEGATIVE     Ketones, Urine 08/09/2024 NEGATIVE     Bilirubin, Urine 08/09/2024 NEGATIVE     Urobilinogen, Urine 08/09/2024 Normal     Nitrite, Urine 08/09/2024  NEGATIVE     Leukocyte Esterase, Urine 08/09/2024 NEGATIVE     WBC, Urine 08/09/2024 NONE     RBC, Urine 08/09/2024 NONE     Squamous Epithelial Cell* 08/09/2024 1-9 (SPARSE)     Mucus, Urine 08/09/2024 3+     Amorphous Crystals, Urine 08/09/2024 3+ (A)    Lab on 06/17/2024   Component Date Value    WBC 06/17/2024 6.7     nRBC 06/17/2024 0.0     RBC 06/17/2024 5.42     Hemoglobin 06/17/2024 17.8 (H)     Hematocrit 06/17/2024 52.6 (H)     MCV 06/17/2024 97     MCH 06/17/2024 32.8     MCHC 06/17/2024 33.8     RDW 06/17/2024 12.3     Platelets 06/17/2024 219     Glucose 06/17/2024 93     Sodium 06/17/2024 141     Potassium 06/17/2024 4.2     Chloride 06/17/2024 103     Bicarbonate 06/17/2024 28     Anion Gap 06/17/2024 14     Urea Nitrogen 06/17/2024 12     Creatinine 06/17/2024 1.16     eGFR 06/17/2024 76     Calcium 06/17/2024 9.7     Albumin 06/17/2024 4.7     Alkaline Phosphatase 06/17/2024 70     Total Protein 06/17/2024 6.9     AST 06/17/2024 17     Bilirubin, Total 06/17/2024 0.7     ALT 06/17/2024 17     Vitamin D, 25-Hydroxy, T* 06/17/2024 30     Vitamin B12 06/17/2024 270     Cholesterol 06/17/2024 151     HDL-Cholesterol 06/17/2024 34.1     Cholesterol/HDL Ratio 06/17/2024 4.4     LDL Calculated 06/17/2024 84     VLDL 06/17/2024 33     Triglycerides 06/17/2024 167 (H)     Non HDL Cholesterol 06/17/2024 117     Thyroid Stimulating Horm* 06/17/2024 2.66     Color, Urine 06/17/2024 Yellow     Appearance, Urine 06/17/2024 Clear     Specific Gravity, Urine 06/17/2024 1.021     pH, Urine 06/17/2024 6.0     Protein, Urine 06/17/2024 10 (TRACE)     Glucose, Urine 06/17/2024 Normal     Blood, Urine 06/17/2024 NEGATIVE     Ketones, Urine 06/17/2024 NEGATIVE     Bilirubin, Urine 06/17/2024 NEGATIVE     Urobilinogen, Urine 06/17/2024 Normal     Nitrite, Urine 06/17/2024 NEGATIVE     Leukocyte Esterase, Urine 06/17/2024 NEGATIVE     WBC, Urine 06/17/2024 1-5     RBC, Urine 06/17/2024 1-2     Mucus, Urine 06/17/2024 FEW          Assessment/Plan   1. Nonischemic congestive cardiomyopathy. This patient does have a background history of hepatitis C, former smoking and a history of alcoholism. He was admitted to [facility] on 05/05/2020 with increasing generalized lower extremity edema and shortness of breath. His initial EKG showed sinus tachycardia with left ventricular hypertrophy and a T-wave inversion abnormality. Echocardiogram demonstrated severe global impairment in left ventricular function with an estimated LV ejection fraction of 20% with 1+ aortic valve insufficiency, oneâ€“2+ mitral valve regurgitation, moderate to severe left atrial enlargement, moderate right ventricular chamber dilatation, 2â€“3 plus tricuspid valve regurgitation, and severe right atrial enlargement. His labs were notable for elevated transaminases thought to be related to passive hepatic congestion as opposed to his former alcoholism or hepatitis C. His creatinine was 1.1 and he did have a mild thrombocytopenia. The patient had no significant lower extremity edema scrotal edema and even a moderate amount of ascites. The patient was placed on an IV Lasix infusion ultimately was diuresed nearly 50 pounds. The patient had a cardiac catheterization performed both right and left-sided which demonstrated normal coronary arteries with marked left ventricular chamber dilatation and severe global impairment in LV systolic contractility LV ejection fraction of 15â€“20 percent. In addition to the IV Lasix infusion the patient was placed on a combination of carvedilol 25 mg twice a day and losartan 100 mg daily. The doses were gradually uptitrated during his hospitalization and was well tolerated by blood pressure. The patient was also started on spironolactone 25 mg daily. At the time of discharge he was some placed on Lasix 40 mg daily as well. His weight has remained relatively stable so since discharge with relatively mild 1+ residual edema. The patient  did have lab work repeated on 05/19/2020 with SMA panel including creatinine of 1.27 potassium of 4.3. Liver function panel was normal except for an ALT of 114. Magnesium level was 2.24. CBC included a WBC of 12,100. Since his last visit on the patient has lost another 6 pounds down to 177 pounds which was his usual weight several years ago. He had repeat lab work on 05/22/2020 which showed complete normalization of his liver function test with stable creatinine of 1.16. The patient is feeling improved. The patient has remained off of smoking and is wearing a nicotinic patch. He does have home oxygen. His current management will be continued unchanged. The patient's weight is now 191 pounds. Laboratory from 05/22/2020 included a normal SMA panel magnesium level of 2.57. Patient with repeat basic metabolic panel today. Present medications will be kept unchanged. He will have repeat echocardiogram performed at which point a decision would be made as to whether or not he would require a ICD implantation. Repeat echo done 6/3/2022 showed much improvement in EF to 55-60% with mild to moderate aortic valve regurgitation and atrial septal aneurysm..  Patient currently having some mild degree of shortness of breath with occasional panic attacks unable to breathe with some heart fluttering.  He did have a repeat echocardiogram 8/31/2023 which showed deteriorations with moderate LV chamber enlargement and an LV ejection fraction of only 25% and with 1+ aortic valve insufficiency 1+ mitral valve regurgitation moderate to severe left atrial enlargement and pulmonary artery systolic pressure of 34 mmHg.  Question compliance with his medications.  Lab work 6/17/2024 included a normal CMP and CBC with hematocrit 52.6 B12 270 vitamin D 30.  Lipid panel included cholesterol 151 LDL 84 HDL 34 triglyceride 167 in the absence of treatment.  Repeat lab work 8/9/2024 included normal CBC and CMP with creatinine of 1.22.  TSH was 3.04.   Cholesterol was 128 HDL 50.  Patient will be scheduled for repeat echocardiogram medication compliance alcohol avoidance advised.  Return to office 4 months.      2. Pulmonary vein thrombosis. The patient have a CT angiogram performed during his admission which demonstrated a rare thrombosis of one of the pulmonary veins for which the patient is currently on Xarelto 20 mg daily. Will most likely discontinue the Xarelto in approximately 3 months pending further clinical follow-up in the review with his repeat echocardiogram.     3. History of hepatitis C. The patient evidently did complete his total course of treatment supervised by gastric urology at [facility].     4. History of alcohol excess.     5. History of chronic smoking. The patient has been a former smoker of 2 packs per day but does not some that have not since his hospital discharge.     6. History of ADHD.         Orders:  Orders Placed This Encounter   Procedures    Transthoracic echo (TTE) complete      Followup Appts:  Future Appointments   Date Time Provider Department Center   12/10/2024  1:30 PM MENT ECHO/STRESS [id number] [facility] [place] [facility]   12/11/2024  2:30 PM Albert Estevez MD [id number] [facility]   12/16/2024  4:00 PM Kenan Echevarria MD [id number] [facility]           ____________________________________________________________  Kenan Echevarria MD  [facility]  Assistant Clinical Professor, [employer]  [facility]

## 2024-10-21 ENCOUNTER — PHARMACY VISIT (OUTPATIENT)
Dept: PHARMACY | Facility: CLINIC | Age: 54
End: 2024-10-21
Payer: MEDICAID

## 2024-10-21 DIAGNOSIS — Z72.0 TOBACCO ABUSE: ICD-10-CM

## 2024-10-21 PROCEDURE — RXMED WILLOW AMBULATORY MEDICATION CHARGE

## 2024-10-21 RX ORDER — MICONAZOLE NITRATE 2 %
CREAM (GRAM) TOPICAL
Qty: 50 EACH | Refills: 1 | Status: SHIPPED | OUTPATIENT
Start: 2024-10-21 | End: 2024-10-28 | Stop reason: SDUPTHER

## 2024-10-28 PROCEDURE — RXMED WILLOW AMBULATORY MEDICATION CHARGE

## 2024-10-28 RX ORDER — MICONAZOLE NITRATE 2 %
CREAM (GRAM) TOPICAL
Qty: 50 EACH | Refills: 1 | Status: SHIPPED | OUTPATIENT
Start: 2024-10-28

## 2024-11-04 ENCOUNTER — PHARMACY VISIT (OUTPATIENT)
Dept: PHARMACY | Facility: CLINIC | Age: 54
End: 2024-11-04
Payer: MEDICAID

## 2024-11-15 DIAGNOSIS — R06.02 SOB (SHORTNESS OF BREATH): ICD-10-CM

## 2024-11-15 PROCEDURE — RXMED WILLOW AMBULATORY MEDICATION CHARGE

## 2024-11-15 RX ORDER — ASPIRIN 81 MG/1
81 TABLET ORAL DAILY
Qty: 30 TABLET | Refills: 0 | Status: SHIPPED | OUTPATIENT
Start: 2024-11-15 | End: 2025-11-15

## 2024-11-15 RX ORDER — ALBUTEROL SULFATE 90 UG/1
2 INHALANT RESPIRATORY (INHALATION) 4 TIMES DAILY
Qty: 6.7 G | Refills: 0 | Status: SHIPPED | OUTPATIENT
Start: 2024-11-15

## 2024-11-18 DIAGNOSIS — Z72.0 TOBACCO ABUSE: ICD-10-CM

## 2024-11-18 RX ORDER — MICONAZOLE NITRATE 2 %
CREAM (GRAM) TOPICAL
Qty: 50 EACH | Refills: 0 | Status: SHIPPED | OUTPATIENT
Start: 2024-11-18 | End: 2024-11-26 | Stop reason: SDUPTHER

## 2024-11-20 ENCOUNTER — PHARMACY VISIT (OUTPATIENT)
Dept: PHARMACY | Facility: CLINIC | Age: 54
End: 2024-11-20
Payer: MEDICAID

## 2024-11-21 PROCEDURE — RXMED WILLOW AMBULATORY MEDICATION CHARGE

## 2024-11-26 ENCOUNTER — PHARMACY VISIT (OUTPATIENT)
Dept: PHARMACY | Facility: CLINIC | Age: 54
End: 2024-11-26
Payer: MEDICAID

## 2024-11-26 DIAGNOSIS — Z72.0 TOBACCO ABUSE: ICD-10-CM

## 2024-11-26 RX ORDER — MICONAZOLE NITRATE 2 %
CREAM (GRAM) TOPICAL
Qty: 50 EACH | Refills: 0 | Status: SHIPPED | OUTPATIENT
Start: 2024-11-26

## 2024-12-03 DIAGNOSIS — Z72.0 TOBACCO ABUSE: ICD-10-CM

## 2024-12-04 PROCEDURE — RXMED WILLOW AMBULATORY MEDICATION CHARGE

## 2024-12-04 RX ORDER — MICONAZOLE NITRATE 2 %
CREAM (GRAM) TOPICAL
Qty: 50 EACH | Refills: 0 | Status: SHIPPED | OUTPATIENT
Start: 2024-12-04

## 2024-12-07 ENCOUNTER — PHARMACY VISIT (OUTPATIENT)
Dept: PHARMACY | Facility: CLINIC | Age: 54
End: 2024-12-07
Payer: MEDICAID

## 2024-12-10 ENCOUNTER — HOSPITAL ENCOUNTER (OUTPATIENT)
Dept: CARDIOLOGY | Facility: CLINIC | Age: 54
Discharge: HOME | End: 2024-12-10
Payer: MEDICAID

## 2024-12-10 DIAGNOSIS — R06.02 SOB (SHORTNESS OF BREATH): ICD-10-CM

## 2024-12-10 LAB
AORTIC VALVE PEAK VELOCITY: 1.34 M/S
AV PEAK GRADIENT: 7 MMHG
AVA (PEAK VEL): 3.42 CM2
EJECTION FRACTION APICAL 4 CHAMBER: 23.8
EJECTION FRACTION: 18 %
LEFT ATRIUM VOLUME AREA LENGTH INDEX BSA: 43.1 ML/M2
LEFT VENTRICLE INTERNAL DIMENSION DIASTOLE: 6.61 CM (ref 3.5–6)
LEFT VENTRICULAR OUTFLOW TRACT DIAMETER: 2.4 CM
MITRAL VALVE E/A RATIO: 15.19
RIGHT VENTRICLE FREE WALL PEAK S': 14 CM/S
RIGHT VENTRICLE PEAK SYSTOLIC PRESSURE: 28.2 MMHG
TRICUSPID ANNULAR PLANE SYSTOLIC EXCURSION: 2.6 CM

## 2024-12-10 PROCEDURE — 93306 TTE W/DOPPLER COMPLETE: CPT | Performed by: INTERNAL MEDICINE

## 2024-12-10 PROCEDURE — 93306 TTE W/DOPPLER COMPLETE: CPT

## 2024-12-11 ENCOUNTER — APPOINTMENT (OUTPATIENT)
Dept: NEUROLOGY | Facility: CLINIC | Age: 54
End: 2024-12-11
Payer: MEDICAID

## 2024-12-11 VITALS
WEIGHT: 181 LBS | HEIGHT: 70 IN | HEART RATE: 100 BPM | DIASTOLIC BLOOD PRESSURE: 62 MMHG | BODY MASS INDEX: 25.91 KG/M2 | SYSTOLIC BLOOD PRESSURE: 108 MMHG

## 2024-12-11 DIAGNOSIS — G45.0 VERTEBROBASILAR INSUFFICIENCY: Primary | ICD-10-CM

## 2024-12-11 DIAGNOSIS — I63.9 CEREBROVASCULAR ACCIDENT (CVA), UNSPECIFIED MECHANISM (MULTI): ICD-10-CM

## 2024-12-11 DIAGNOSIS — R41.89 COGNITIVE IMPAIRMENT: ICD-10-CM

## 2024-12-11 PROCEDURE — 3008F BODY MASS INDEX DOCD: CPT | Performed by: PSYCHIATRY & NEUROLOGY

## 2024-12-11 PROCEDURE — 99205 OFFICE O/P NEW HI 60 MIN: CPT | Performed by: PSYCHIATRY & NEUROLOGY

## 2024-12-11 ASSESSMENT — MINI MENTAL STATE EXAM
NAME OR REPEAT 3 OBJECTS - (APPLE, TABLE, PENNY) OR (BALL, TREE, FLAG): 3 CORRECT
SUM ALL MMSE QUESTIONS FOR TOTAL SCORE [OUT OF 30].: 28
WHAT IS THE YEAR, SEASON, DATE, DAY, AND MONTH: 5 CORRECT
HAND THE PERSON A PENCIL AND PAPER. SAY:  WRITE ANY COMPLETE SENTENCE ON THAT PIECE OF PAPER. (NOTE: THE SENTENCE MUST MAKE SENSE.  IGNORE SPELLING ERRORS): 1 CORRECT
PLEASE COPY THIS PICTURE (NOTE ALL 10 ANGLES MUST BE PRESENT AND TWO MUST INTERSECT): 1 CORRECT
SPELL THE WORD WORLD FORWARD AND BACKWARDS OR SERIAL 7S: 5 CORRECT
SAY:  READ THE WORDS ON THE PAGE AND THEN DO WHAT IT SAYS.  THEN HAND THE PERSON THE SHEET WITH CLOSE YOUR EYES ON IT.  IF THE SUBJECT READS AND DOES NOT CLOSE THEIR EYES, REPEAT UP TO THREE TIMES.  SCORE ONLY IF SUBJECT CLOSES EYES.: 3 CORRECT
SHOW: PENCIL [OBJECT] ASK: WHAT IS THIS CALLED?: 2 CORRECT
RECALL THE 3 OBJECTS FROM ABOVE (APPLE, TABLE, PENNY) OR (BALL, TREE, FLAG): 1 CORRECT
PLACE DESIGN, ERASER AND PENCIL IN FRONT OF THE PERSON.  SAY:  COPY THIS DESIGN PLEASE.  SHOW: DESIGN. ALLOW: MULTIPLE TRIES. WAIT UNTIL PERSON IS FINISHED AND HANDS IT BACK. SCORE: ONLY FOR DIAGRAM WITH 4-SIDED FIGURE BETWEEN TWO 5-SIDED FIGURES: 1 CORRECT
SAY: I WOULD LIKE YOU TO REPEAT THIS PHRASE AFTER ME: NO IFS, ANDS, OR BUTS.: 1 CORRECT
WHAT STATE, COUNTRY, CITY, HOSPITAL, FLOOR: 5 CORRECT

## 2024-12-11 NOTE — PATIENT INSTRUCTIONS
You appear to have intermittent dizziness/vertigo, which is related to standing up and going on stairs, and I want to ruloe out a vertebrobasilar insufficiency- partial or total blockage of blood flow to the brainstem/cerebellum of the brain.  If the CT head and CTA studies of the head and neck are normal/negative, then my best explanation for your dizziness problem is the severely decreased ejection fraction of the heart.  Please ask Dr. Echevarria if your tiredness and dizziness is related to your heart  failure.  I do not see any evidence of dementia developing at this time.  Follow up in 2 months.

## 2024-12-11 NOTE — PROGRESS NOTES
Subjective   Austin Gould is a 54 y.o.   male.  HPI  This is a 54 year old man referred by Dr. MATT Couch for dizziness, headache, and memory issue.  He feels vertigo, relieved by lying down.  He can feel real faint, climbing stairs- feels like he could pass out from the exertion.    Last year,8/29/23: severe pain standing up, severe chest pain- could not breathe properly, taken to ED, admitted to the ICU- bilateral pneumonia, given diuretic, he has had progressive weakness of the heart- echo done 12/10/24: severely reduced EF- 15-20%, with global hypokinesis.  His dizziness problem is reduced by lying down.  He has to get up gradually in the morning, to avoid vertigo.  He lives in a room in a from a friend.  He denies any vertigo turning over in bed.  He has had dizziness, at least since 2022: MRI of the brain without and with contrast- normal.  MRI ordered in 6/2024: not done, denied by his health insurance.  He developed CHF- in 8/23 by echocardiogram.  He has intermittent bad HA's in the upper cheekbones, behind the eyes- associated with a toothache.  He takes Tomasa aspirin, 1-2 tablets over a few hours helps.  He is an ex-smoker.  He has intermittent problems with his memory: forgets the year, can forget where his keys are, where clothes items are, people's names, has short term memory impairment.  Previously, has trouble repairing cars.  He forgets why he has gone to the store.  He thinks he is safe to drive- he is overly cautious.  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  Blood work this year:  normal vitamins B12 and D, TSH.  He was a heavy alcohol user from his teen years to 8 years ago: about a bottle of 180 proof vodka and 10 beers per night.  Objective   Neurological Exam  Mental Status: Awake and alert. Oriented to person, place and time. Speech was fluent to history.   MMSE= 28/30, 18 words given in one minute,  "beginning with the letter \"F\".    CN: Pupils were 4/4mm to 2/2mm. VF intact to finger counting. Ocular versions were intact. Facial sensation was intact to light touch bilaterally. Facial expression was symmetric. Palate elevated symmetrically. Shoulder shrug was symmetric. Tongue protruded midline.     Motor: Normal muscle bulk and tone. Strength was 5/5 bilaterally for shoulder abduction, elbow flexion/extension,  strength, hip flexion, knee flexion/extension, ankle dorsi- and plantar flexion. There were no abnormal movements.     Sensory: no loss of pinprick, proprioception, vibratory in the large toes bilaterally.    Coordination: Finger to nose and heel to shin were intact with no dysmetria.     Reflexes: 0+reflexes in the biceps bilaterally, 2+ bilaterally in patella, and achilles. Toes were downgoing bilaterally.    Gait: Narrow based and normal. Intact heel-raise, toe-raise and tandem gait.    Physical Exam  I personally reviewed laboratory, radiographic, and medical studies which were pertinent for nothing.    Assessment/Plan     "

## 2024-12-16 ENCOUNTER — OFFICE VISIT (OUTPATIENT)
Dept: CARDIOLOGY | Facility: CLINIC | Age: 54
End: 2024-12-16
Payer: MEDICAID

## 2024-12-16 VITALS
HEART RATE: 100 BPM | WEIGHT: 189 LBS | OXYGEN SATURATION: 97 % | DIASTOLIC BLOOD PRESSURE: 86 MMHG | SYSTOLIC BLOOD PRESSURE: 124 MMHG | BODY MASS INDEX: 27.06 KG/M2 | HEIGHT: 70 IN

## 2024-12-16 DIAGNOSIS — R55 NEAR SYNCOPE: Primary | ICD-10-CM

## 2024-12-16 DIAGNOSIS — Z72.0 TOBACCO ABUSE: ICD-10-CM

## 2024-12-16 PROCEDURE — RXMED WILLOW AMBULATORY MEDICATION CHARGE

## 2024-12-16 PROCEDURE — 3008F BODY MASS INDEX DOCD: CPT | Performed by: INTERNAL MEDICINE

## 2024-12-16 PROCEDURE — 99214 OFFICE O/P EST MOD 30 MIN: CPT | Performed by: INTERNAL MEDICINE

## 2024-12-16 RX ORDER — MICONAZOLE NITRATE 2 %
CREAM (GRAM) TOPICAL
Qty: 50 EACH | Refills: 0 | Status: SHIPPED | OUTPATIENT
Start: 2024-12-16 | End: 2024-12-27 | Stop reason: SDUPTHER

## 2024-12-16 ASSESSMENT — PATIENT HEALTH QUESTIONNAIRE - PHQ9
SUM OF ALL RESPONSES TO PHQ9 QUESTIONS 1 AND 2: 0
1. LITTLE INTEREST OR PLEASURE IN DOING THINGS: NOT AT ALL
2. FEELING DOWN, DEPRESSED OR HOPELESS: NOT AT ALL

## 2024-12-16 ASSESSMENT — LIFESTYLE VARIABLES
HOW OFTEN DO YOU HAVE A DRINK CONTAINING ALCOHOL: NEVER
AUDIT-C TOTAL SCORE: 0
HOW OFTEN DO YOU HAVE SIX OR MORE DRINKS ON ONE OCCASION: NEVER
SKIP TO QUESTIONS 9-10: 1
HOW MANY STANDARD DRINKS CONTAINING ALCOHOL DO YOU HAVE ON A TYPICAL DAY: PATIENT DOES NOT DRINK

## 2024-12-16 ASSESSMENT — ENCOUNTER SYMPTOMS
OCCASIONAL FEELINGS OF UNSTEADINESS: 0
DEPRESSION: 0
LOSS OF SENSATION IN FEET: 0

## 2024-12-16 ASSESSMENT — COLUMBIA-SUICIDE SEVERITY RATING SCALE - C-SSRS
2. HAVE YOU ACTUALLY HAD ANY THOUGHTS OF KILLING YOURSELF?: NO
1. IN THE PAST MONTH, HAVE YOU WISHED YOU WERE DEAD OR WISHED YOU COULD GO TO SLEEP AND NOT WAKE UP?: NO
6. HAVE YOU EVER DONE ANYTHING, STARTED TO DO ANYTHING, OR PREPARED TO DO ANYTHING TO END YOUR LIFE?: NO

## 2024-12-16 ASSESSMENT — PAIN SCALES - GENERAL: PAINLEVEL_OUTOF10: 0-NO PAIN

## 2024-12-16 NOTE — PROGRESS NOTES
Primary Care Physician: Parth Couch MD  Date of Visit: 12/16/2024  4:00 PM EST  Location of visit: [facility] [place] [address]     Chief Complaint:   Chief Complaint   Patient presents with    Results     echo     HPI / Summary:   Austin Gould is a 54 y.o. male presents follow-up    ROS    Medical History:   He has a past medical history of Body mass index (BMI) 26.0-26.9, adult (05/22/2020) and Personal history of other mental and behavioral disorders.  Surgical Hx:   He has no past surgical history on file.   Social Hx:   He reports that he quit smoking about 4 years ago. His smoking use included cigarettes. He has quit using smokeless tobacco.  His smokeless tobacco use included chew. He reports that he does not currently use alcohol. He reports that he does not currently use drugs after having used the following drugs: Marijuana.  Family Hx:   His family history is not on file.   Allergies:  No Known Allergies  Outpatient Medications:  Current Outpatient Medications   Medication Instructions    albuterol 90 mcg/actuation inhaler Inhale 2 puffs by mouth 4 times a day.    aspirin 81 mg, oral, Daily    carvedilol (Coreg) 25 mg tablet TAKE 1 TABLET BY MOUTH TWO TIMES A DAY WITH OR AFTER A MEAL    furosemide (Lasix) 40 mg tablet TAKE 1 TABLET BY MOUTH ONE TIME DAILY    nicotine polacrilex (Nicorette) 2 mg gum CHEW ONE PIECE SLOWLY AND INTERMITTENTLY FOR 30 MINUTES. REPEAT EVERY 1 TO 2 HOURS. MAXIMUM OF 24 PIECES/DAY    potassium chloride CR (Klor-Con M20) 20 mEq ER tablet TAKE 1 TABLET BY MOUTH ONE TIME DAILY WITH BREAKFAST AND A FULL GLASS OF WATER    sacubitriL-valsartan (Entresto) 24-26 mg tablet TAKE 1 TABLET BY MOUTH TWO TIMES A DAY    spironolactone (Aldactone) 25 mg tablet TAKE 1 TABLET BY MOUTH ONE TIME DAILY     Physical Exam:  Vitals:    12/16/24 1640   BP: (!) 159/96   BP Location: Left arm   Patient Position: Sitting   BP Cuff Size: Adult   Pulse: 109   SpO2: 97%   Weight: 85.7 kg (189 lb)   Height: 1.778 m  "(5' 10\")     Wt Readings from Last 5 Encounters:   12/16/24 85.7 kg (189 lb)   12/11/24 82.1 kg (181 lb)   10/07/24 81.7 kg (180 lb 3 oz)   08/09/24 82.1 kg (181 lb)   06/07/24 83.5 kg (184 lb)     Physical Exam  JVP not elevated. Carotid impulses are 2+ without overlying bruit.   Chest exhibits fair to good air movement with completely clear breath sounds.   The cardiac rhythm is regular with no premature beats.   Normal S1 and S2. No gallop, murmur or rub, or click.   Abdomen is soft and benign without focal tenderness.   With no lower leg edema. The pedal pulses are intact.     Last Labs:  Hospital Outpatient Visit on 12/10/2024   Component Date Value    AV pk dariela 12/10/2024 1.34     LVOT diam 12/10/2024 2.40     MV E/A ratio 12/10/2024 15.19     LA vol index A/L 12/10/2024 43.1     Tricuspid annular plane * 12/10/2024 2.6     LV EF 12/10/2024 18     RV free wall pk S' 12/10/2024 14.00     LVIDd 12/10/2024 6.61     RVSP 12/10/2024 28.2     Aortic Valve Area by Con* 12/10/2024 3.42     AV pk grad 12/10/2024 7     LV A4C EF 12/10/2024 23.8    Lab on 08/09/2024   Component Date Value    Glucose 08/09/2024 93     Sodium 08/09/2024 142     Potassium 08/09/2024 4.2     Chloride 08/09/2024 104     Bicarbonate 08/09/2024 25     Anion Gap 08/09/2024 17     Urea Nitrogen 08/09/2024 19     Creatinine 08/09/2024 1.22     eGFR 08/09/2024 71     Calcium 08/09/2024 9.7     Albumin 08/09/2024 4.9     Alkaline Phosphatase 08/09/2024 62     Total Protein 08/09/2024 6.7     AST 08/09/2024 25     Bilirubin, Total 08/09/2024 1.0     ALT 08/09/2024 21     WBC 08/09/2024 7.1     nRBC 08/09/2024 0.0     RBC 08/09/2024 4.78     Hemoglobin 08/09/2024 15.7     Hematocrit 08/09/2024 45.7     MCV 08/09/2024 96     MCH 08/09/2024 32.8     MCHC 08/09/2024 34.4     RDW 08/09/2024 12.2     Platelets 08/09/2024 175     Thyroid Stimulating Horm* 08/09/2024 3.07     Cholesterol 08/09/2024 128     HDL-Cholesterol 08/09/2024 49.8     Cholesterol/HDL " Ratio 08/09/2024 2.6     Non-HDL Cholesterol 08/09/2024 78     Color, Urine 08/09/2024 Light-[place] (N)     Appearance, Urine 08/09/2024 Ex.Turbid (N)     Specific Gravity, Urine 08/09/2024 1.029     pH, Urine 08/09/2024 5.5     Protein, Urine 08/09/2024 30 (1+) (A)     Glucose, Urine 08/09/2024 Normal     Blood, Urine 08/09/2024 NEGATIVE     Ketones, Urine 08/09/2024 NEGATIVE     Bilirubin, Urine 08/09/2024 NEGATIVE     Urobilinogen, Urine 08/09/2024 Normal     Nitrite, Urine 08/09/2024 NEGATIVE     Leukocyte Esterase, Urine 08/09/2024 NEGATIVE     WBC, Urine 08/09/2024 NONE     RBC, Urine 08/09/2024 NONE     Squamous Epithelial Cell* 08/09/2024 1-9 (SPARSE)     Mucus, Urine 08/09/2024 3+     Amorphous Crystals, Urine 08/09/2024 3+ (A)    Lab on 06/17/2024   Component Date Value    WBC 06/17/2024 6.7     nRBC 06/17/2024 0.0     RBC 06/17/2024 5.42     Hemoglobin 06/17/2024 17.8 (H)     Hematocrit 06/17/2024 52.6 (H)     MCV 06/17/2024 97     MCH 06/17/2024 32.8     MCHC 06/17/2024 33.8     RDW 06/17/2024 12.3     Platelets 06/17/2024 219     Glucose 06/17/2024 93     Sodium 06/17/2024 141     Potassium 06/17/2024 4.2     Chloride 06/17/2024 103     Bicarbonate 06/17/2024 28     Anion Gap 06/17/2024 14     Urea Nitrogen 06/17/2024 12     Creatinine 06/17/2024 1.16     eGFR 06/17/2024 76     Calcium 06/17/2024 9.7     Albumin 06/17/2024 4.7     Alkaline Phosphatase 06/17/2024 70     Total Protein 06/17/2024 6.9     AST 06/17/2024 17     Bilirubin, Total 06/17/2024 0.7     ALT 06/17/2024 17     Vitamin D, 25-Hydroxy, T* 06/17/2024 30     Vitamin B12 06/17/2024 270     Cholesterol 06/17/2024 151     HDL-Cholesterol 06/17/2024 34.1     Cholesterol/HDL Ratio 06/17/2024 4.4     LDL Calculated 06/17/2024 84     VLDL 06/17/2024 33     Triglycerides 06/17/2024 167 (H)     Non HDL Cholesterol 06/17/2024 117     Thyroid Stimulating Horm* 06/17/2024 2.66     Color, Urine 06/17/2024 Yellow     Appearance, Urine 06/17/2024 Clear      Specific Gravity, Urine 06/17/2024 1.021     pH, Urine 06/17/2024 6.0     Protein, Urine 06/17/2024 10 (TRACE)     Glucose, Urine 06/17/2024 Normal     Blood, Urine 06/17/2024 NEGATIVE     Ketones, Urine 06/17/2024 NEGATIVE     Bilirubin, Urine 06/17/2024 NEGATIVE     Urobilinogen, Urine 06/17/2024 Normal     Nitrite, Urine 06/17/2024 NEGATIVE     Leukocyte Esterase, Urine 06/17/2024 NEGATIVE     WBC, Urine 06/17/2024 1-5     RBC, Urine 06/17/2024 1-2     Mucus, Urine 06/17/2024 FEW         Assessment/Plan   1. Nonischemic congestive cardiomyopathy. This patient does have a background history of hepatitis C, former smoking and a history of alcoholism. He was admitted to [facility] on 05/05/2020 with increasing generalized lower extremity edema and shortness of breath. His initial EKG showed sinus tachycardia with left ventricular hypertrophy and a T-wave inversion abnormality. Echocardiogram demonstrated severe global impairment in left ventricular function with an estimated LV ejection fraction of 20% with 1+ aortic valve insufficiency, oneâ€“2+ mitral valve regurgitation, moderate to severe left atrial enlargement, moderate right ventricular chamber dilatation, 2â€“3 plus tricuspid valve regurgitation, and severe right atrial enlargement. His labs were notable for elevated transaminases thought to be related to passive hepatic congestion as opposed to his former alcoholism or hepatitis C. His creatinine was 1.1 and he did have a mild thrombocytopenia. The patient had no significant lower extremity edema scrotal edema and even a moderate amount of ascites. The patient was placed on an IV Lasix infusion ultimately was diuresed nearly 50 pounds. The patient had a cardiac catheterization performed both right and left-sided which demonstrated normal coronary arteries with marked left ventricular chamber dilatation and severe global impairment in LV systolic contractility LV ejection fraction of 15â€“20  percent. In addition to the IV Lasix infusion the patient was placed on a combination of carvedilol 25 mg twice a day and losartan 100 mg daily. The doses were gradually uptitrated during his hospitalization and was well tolerated by blood pressure. The patient was also started on spironolactone 25 mg daily. At the time of discharge he was some placed on Lasix 40 mg daily as well. His weight has remained relatively stable so since discharge with relatively mild 1+ residual edema. The patient did have lab work repeated on 05/19/2020 with SMA panel including creatinine of 1.27 potassium of 4.3. Liver function panel was normal except for an ALT of 114. Magnesium level was 2.24. CBC included a WBC of 12,100. Since his last visit on the patient has lost another 6 pounds down to 177 pounds which was his usual weight several years ago. He had repeat lab work on 05/22/2020 which showed complete normalization of his liver function test with stable creatinine of 1.16. The patient is feeling improved. The patient has remained off of smoking and is wearing a nicotinic patch. He does have home oxygen. His current management will be continued unchanged. The patient's weight is now 191 pounds. Laboratory from 05/22/2020 included a normal SMA panel magnesium level of 2.57. Patient with repeat basic metabolic panel today. Present medications will be kept unchanged. He will have repeat echocardiogram performed at which point a decision would be made as to whether or not he would require a ICD implantation. Repeat echo done 6/3/2022 showed much improvement in EF to 55-60% with mild to moderate aortic valve regurgitation and atrial septal aneurysm..  Patient currently having some mild degree of shortness of breath with occasional panic attacks unable to breathe with some heart fluttering.  He did have a repeat echocardiogram 8/31/2023 which showed deteriorations with moderate LV chamber enlargement and an LV ejection fraction of only  25% and with 1+ aortic valve insufficiency 1+ mitral valve regurgitation moderate to severe left atrial enlargement and pulmonary artery systolic pressure of 34 mmHg.  Question compliance with his medications.  Lab work 6/17/2024 included a normal CMP and CBC with hematocrit 52.6 B12 270 vitamin D 30.  Lipid panel included cholesterol 151 LDL 84 HDL 34 triglyceride 167 in the absence of treatment.  Repeat lab work 8/9/2024 included normal CBC and CMP with creatinine of 1.22.  TSH was 3.04.  Cholesterol was 128 HDL 50.  Patient will be scheduled for repeat echocardiogram medication compliance alcohol avoidance advised.  Return to office 4 months.  Repeat echocardiogram performed 12/10/2024 again demonstrates a markedly reduced LV ejection fraction at 15-20% with 1-2+ aortic insufficiency trace/1+ mitral valve regurgitation trace tricuspid valve regurgitation and atrial septal aneurysm.  The patient has not been consistently compliant with his medications for the nonischemic congestive cardiomyopathy with variable LV ejection fractions as noted above.  He will be referred to electrophysiology for consideration of a prophylactic ICD.  Medication compliance again emphasized with current treatment plan including carvedilol 25 mg twice daily Entresto 24/26 mg twice daily spironolactone 25 mg daily.      2. Pulmonary vein thrombosis. The patient have a CT angiogram performed during his admission which demonstrated a rare thrombosis of one of the pulmonary veins for which the patient is currently on Xarelto 20 mg daily. Will most likely discontinue the Xarelto in approximately 3 months pending further clinical follow-up in the review with his repeat echocardiogram.     3. History of hepatitis C. The patient evidently did complete his total course of treatment supervised by [facility] urology at [facility].     4. History of alcohol excess.     5. History of chronic smoking. The patient has been a former smoker of 2  packs per day but does not some that have not since his hospital discharge.     6. History of ADHD.         Orders:  No orders of the defined types were placed in this encounter.     Followup Appts:  Future Appointments   Date Time Provider Department Center   2/11/2025  3:30 PM Albert Estevez MD [id number] [facility]           ____________________________________________________________  Kenan Echevarria MD  [facility] [place]  Assistant Clinical Professor, [employer]  [facility]

## 2024-12-17 DIAGNOSIS — R06.02 SOB (SHORTNESS OF BREATH): ICD-10-CM

## 2024-12-17 RX ORDER — ASPIRIN 81 MG/1
81 TABLET ORAL DAILY
Qty: 30 TABLET | Refills: 0 | Status: SHIPPED | OUTPATIENT
Start: 2024-12-17 | End: 2025-12-17

## 2024-12-17 RX ORDER — ALBUTEROL SULFATE 90 UG/1
2 INHALANT RESPIRATORY (INHALATION) 4 TIMES DAILY
Qty: 6.7 G | Refills: 0 | Status: SHIPPED | OUTPATIENT
Start: 2024-12-17

## 2024-12-18 PROCEDURE — RXMED WILLOW AMBULATORY MEDICATION CHARGE

## 2024-12-20 ENCOUNTER — PHARMACY VISIT (OUTPATIENT)
Dept: PHARMACY | Facility: CLINIC | Age: 54
End: 2024-12-20
Payer: MEDICAID

## 2024-12-27 DIAGNOSIS — Z72.0 TOBACCO ABUSE: ICD-10-CM

## 2024-12-28 RX ORDER — MICONAZOLE NITRATE 2 %
CREAM (GRAM) TOPICAL
Qty: 50 EACH | Refills: 0 | Status: SHIPPED | OUTPATIENT
Start: 2024-12-28

## 2024-12-30 PROCEDURE — RXMED WILLOW AMBULATORY MEDICATION CHARGE

## 2024-12-31 ENCOUNTER — PHARMACY VISIT (OUTPATIENT)
Dept: PHARMACY | Facility: CLINIC | Age: 54
End: 2024-12-31
Payer: MEDICAID

## 2025-01-10 DIAGNOSIS — R06.02 SOB (SHORTNESS OF BREATH): ICD-10-CM

## 2025-01-10 DIAGNOSIS — Z72.0 TOBACCO ABUSE: ICD-10-CM

## 2025-01-10 RX ORDER — MICONAZOLE NITRATE 2 %
CREAM (GRAM) TOPICAL
Qty: 50 EACH | Refills: 0 | Status: SHIPPED | OUTPATIENT
Start: 2025-01-10

## 2025-01-10 RX ORDER — ALBUTEROL SULFATE 90 UG/1
2 INHALANT RESPIRATORY (INHALATION) 4 TIMES DAILY
Qty: 6.7 G | Refills: 0 | Status: SHIPPED | OUTPATIENT
Start: 2025-01-10

## 2025-01-11 PROCEDURE — RXMED WILLOW AMBULATORY MEDICATION CHARGE

## 2025-01-13 ENCOUNTER — PHARMACY VISIT (OUTPATIENT)
Dept: PHARMACY | Facility: CLINIC | Age: 55
End: 2025-01-13
Payer: MEDICAID

## 2025-01-15 DIAGNOSIS — Z72.0 TOBACCO ABUSE: ICD-10-CM

## 2025-01-15 DIAGNOSIS — R06.02 SOB (SHORTNESS OF BREATH): ICD-10-CM

## 2025-01-15 PROCEDURE — RXMED WILLOW AMBULATORY MEDICATION CHARGE

## 2025-01-15 RX ORDER — MICONAZOLE NITRATE 2 %
CREAM (GRAM) TOPICAL
Qty: 50 EACH | Refills: 0 | Status: SHIPPED | OUTPATIENT
Start: 2025-01-15

## 2025-01-15 RX ORDER — ALBUTEROL SULFATE 90 UG/1
2 INHALANT RESPIRATORY (INHALATION) 4 TIMES DAILY
Qty: 6.7 G | Refills: 0 | Status: SHIPPED | OUTPATIENT
Start: 2025-01-15

## 2025-01-16 ENCOUNTER — PHARMACY VISIT (OUTPATIENT)
Dept: PHARMACY | Facility: CLINIC | Age: 55
End: 2025-01-16
Payer: MEDICAID

## 2025-01-20 NOTE — PROGRESS NOTES
I had the pleasure seeing Austin Gould     No chief complaint on file.     OUTPATIENT CONSULTATION: Cardiac Electrophysiology  DOS: January 22, 2025  REASON: near Syncope  REFERRING:  Kenan Echevarria MD    He was previously seen by my colleague Torri Gomez MD in June 2021.      Current Outpatient Medications   Medication Instructions    albuterol 90 mcg/actuation inhaler Inhale 2 puffs by mouth 4 times a day.    aspirin 81 mg, oral, Daily    carvedilol (Coreg) 25 mg tablet TAKE 1 TABLET BY MOUTH TWO TIMES A DAY WITH OR AFTER A MEAL    furosemide (Lasix) 40 mg tablet TAKE 1 TABLET BY MOUTH ONE TIME DAILY    nicotine polacrilex (Nicorette) 2 mg gum CHEW ONE PIECE SLOWLY AND INTERMITTENTLY FOR 30 MINUTES. REPEAT EVERY 1 TO 2 HOURS. MAXIMUM OF 24 PIECES/DAY    potassium chloride CR (Klor-Con M20) 20 mEq ER tablet TAKE 1 TABLET BY MOUTH ONE TIME DAILY WITH BREAKFAST AND A FULL GLASS OF WATER    sacubitriL-valsartan (Entresto) 24-26 mg tablet TAKE 1 TABLET BY MOUTH TWO TIMES A DAY    spironolactone (Aldactone) 25 mg tablet TAKE 1 TABLET BY MOUTH ONE TIME DAILY      Austin Gould is a 54 y.o. with:     Pmh includes Anxiety, Dizziness, Dyspnea, ADHD, Hep C, Pulmonary Vein Thrombosis, Former Smoker, hx of alcoholism, and Vertebrobasilar Insufficiency.      Cardiac Hx    PE - was on Xarelto   NICM/ HF - diagnosis in 2020. Subsequent improvement of the EF . Was evaluated for an ICD in the past. EF subsequently improved (in 2022) but since 2023 he has had low EF.   History of Hepatatis C - completed treatment  History of ETOH use , has not been using for 8 years   History of ADHD       CARDIAC TESTING:    -ECHO/ TTE:  (12/10/24) LVEF 15-20%.  LV global hypokinesis, sev dilated, and sev eccentric LVH.  Mild-mod LAE, Atrial septum bows to the right.  Mild-mod AR.  Atrial Septal aneurysm noted.  LVIDd 6.61 cm.        Objective   Physical Exam  Constitutional: alert and in no acute distress.   Eyes: no erythema, swelling  or discharge from the eye .   Neck: neck is supple, symmetric, trachea midline, no masses .   Pulmonary: no increased work of breathing or signs of respiratory distress  and lungs clear to auscultation.    Cardiovascular:  regular rhythm, normal S1 and S2, no murmurs , pedal pulses 2+ bilaterally  and no edema .   Abdomen: abdomen non-tender, no masses .   Skin: skin warm and dry, normal skin turgor .   Psychiatric judgment and insight is normal  and oriented to person, place and time .             Assessment/Plan   Nonischemic CMP with persistently low EF. We discussed the risk of sudden cardiac death. He has had friends who  in sleep and would like to avoid it. Discussed different types of ICD . He prefers the substernal one and given his age, lack of need for pacing I feel it is a good option. The [place] SDM tool was used for shared decision making during this clinic visit. The potential benefits and risks of the procedure were reviewed with the patient based on the best available evidence. Decisions that were made took into account the patient's health goals, preferences, and values. All the patient's questions were addressed . The risks of the procedure were discussed in detail, including but not limited to bleeding, infection, lead dislodgement,  cardiac perforation, need for cardiac or vascular surgery, myocardial infarction, stroke, and a small chance of death. Additionally, we discussed possible long-term complications including -initiated device recalls, inappropriate shocks (which might carry significant morbidity when his psychiatric situation is taken into account), lead integrity issues, and late infections. The patient understands these risks and wishes to proceed with device implantation. We are planning for 2025

## 2025-01-22 ENCOUNTER — OFFICE VISIT (OUTPATIENT)
Dept: CARDIOLOGY | Facility: CLINIC | Age: 55
End: 2025-01-22
Payer: MEDICAID

## 2025-01-22 ENCOUNTER — HOSPITAL ENCOUNTER (OUTPATIENT)
Facility: HOSPITAL | Age: 55
Setting detail: OUTPATIENT SURGERY
End: 2025-01-22
Attending: INTERNAL MEDICINE | Admitting: INTERNAL MEDICINE
Payer: MEDICAID

## 2025-01-22 VITALS
BODY MASS INDEX: 26.34 KG/M2 | SYSTOLIC BLOOD PRESSURE: 151 MMHG | WEIGHT: 184 LBS | OXYGEN SATURATION: 99 % | HEIGHT: 70 IN | HEART RATE: 109 BPM | DIASTOLIC BLOOD PRESSURE: 98 MMHG

## 2025-01-22 DIAGNOSIS — I42.0 DILATED CARDIOMYOPATHY (MULTI): ICD-10-CM

## 2025-01-22 DIAGNOSIS — I42.0 CONGESTIVE CARDIOMYOPATHY (MULTI): ICD-10-CM

## 2025-01-22 LAB
ATRIAL RATE: 101 BPM
P AXIS: 65 DEGREES
P OFFSET: 200 MS
P ONSET: 144 MS
PR INTERVAL: 146 MS
Q ONSET: 217 MS
QRS COUNT: 17 BEATS
QRS DURATION: 106 MS
QT INTERVAL: 370 MS
QTC CALCULATION(BAZETT): 479 MS
QTC FREDERICIA: 440 MS
R AXIS: -4 DEGREES
T AXIS: 160 DEGREES
T OFFSET: 402 MS
VENTRICULAR RATE: 101 BPM

## 2025-01-22 PROCEDURE — 93005 ELECTROCARDIOGRAM TRACING: CPT | Performed by: INTERNAL MEDICINE

## 2025-01-22 PROCEDURE — 99215 OFFICE O/P EST HI 40 MIN: CPT | Performed by: INTERNAL MEDICINE

## 2025-01-22 PROCEDURE — 1036F TOBACCO NON-USER: CPT | Performed by: INTERNAL MEDICINE

## 2025-01-22 PROCEDURE — 3008F BODY MASS INDEX DOCD: CPT | Performed by: INTERNAL MEDICINE

## 2025-01-22 ASSESSMENT — PATIENT HEALTH QUESTIONNAIRE - PHQ9
SUM OF ALL RESPONSES TO PHQ9 QUESTIONS 1 AND 2: 1
2. FEELING DOWN, DEPRESSED OR HOPELESS: NOT AT ALL
1. LITTLE INTEREST OR PLEASURE IN DOING THINGS: NOT AT ALL
1. LITTLE INTEREST OR PLEASURE IN DOING THINGS: NOT AT ALL
SUM OF ALL RESPONSES TO PHQ9 QUESTIONS 1 AND 2: 0
2. FEELING DOWN, DEPRESSED OR HOPELESS: SEVERAL DAYS

## 2025-01-22 ASSESSMENT — ENCOUNTER SYMPTOMS: DEPRESSION: 0

## 2025-01-31 DIAGNOSIS — Z72.0 TOBACCO ABUSE: ICD-10-CM

## 2025-01-31 RX ORDER — MICONAZOLE NITRATE 2 %
CREAM (GRAM) TOPICAL
Qty: 50 EACH | Refills: 0 | Status: SHIPPED | OUTPATIENT
Start: 2025-01-31

## 2025-02-03 PROCEDURE — RXMED WILLOW AMBULATORY MEDICATION CHARGE

## 2025-02-05 ENCOUNTER — PHARMACY VISIT (OUTPATIENT)
Dept: PHARMACY | Facility: CLINIC | Age: 55
End: 2025-02-05
Payer: MEDICAID

## 2025-02-11 ENCOUNTER — APPOINTMENT (OUTPATIENT)
Dept: NEUROLOGY | Facility: CLINIC | Age: 55
End: 2025-02-11
Payer: MEDICAID

## 2025-02-11 VITALS
HEIGHT: 70 IN | BODY MASS INDEX: 26.2 KG/M2 | HEART RATE: 107 BPM | WEIGHT: 183 LBS | SYSTOLIC BLOOD PRESSURE: 136 MMHG | DIASTOLIC BLOOD PRESSURE: 92 MMHG

## 2025-02-11 DIAGNOSIS — R42 POSTURAL DIZZINESS: Primary | ICD-10-CM

## 2025-02-11 PROCEDURE — 3008F BODY MASS INDEX DOCD: CPT | Performed by: PSYCHIATRY & NEUROLOGY

## 2025-02-11 PROCEDURE — 1036F TOBACCO NON-USER: CPT | Performed by: PSYCHIATRY & NEUROLOGY

## 2025-02-11 PROCEDURE — 99213 OFFICE O/P EST LOW 20 MIN: CPT | Performed by: PSYCHIATRY & NEUROLOGY

## 2025-02-11 NOTE — PATIENT INSTRUCTIONS
You have frequent episodes of postural dizziness and other symptoms, due to a severely reduced cardiac ejection fraction.  I recommend continuing your cardiac management, no need for further neurological follow up.

## 2025-02-11 NOTE — PROGRESS NOTES
Subjective   Austin Gould is a 54 y.o.   male.  HPI  This is a 54 year old man with orthostatic dizziness, last seen in 12/24.  He has orthostatic numbness in the right leg, discomfort across the chest, can lose his balance, breathes fast, then if he lies down, his symptoms are relieved.  He is being evaluated for a defibrillator.  He has been evaluated for a heart transplant.  He did not have a head CT/CTA- not approved by his [place].  He has had a low EF since 2023: 12/10/24 TTE, LVEF 15-20%.  Objective   Neurological Exam  Physical Exam  I personally reviewed laboratory, radiographic, and medical studies which were pertinent for nothing.    Assessment/Plan

## 2025-02-15 DIAGNOSIS — Z72.0 TOBACCO ABUSE: ICD-10-CM

## 2025-02-15 PROCEDURE — RXMED WILLOW AMBULATORY MEDICATION CHARGE

## 2025-02-15 RX ORDER — MICONAZOLE NITRATE 2 %
CREAM (GRAM) TOPICAL
Qty: 50 EACH | Refills: 0 | Status: SHIPPED | OUTPATIENT
Start: 2025-02-15

## 2025-02-16 PROCEDURE — RXMED WILLOW AMBULATORY MEDICATION CHARGE

## 2025-02-17 ENCOUNTER — PHARMACY VISIT (OUTPATIENT)
Dept: PHARMACY | Facility: CLINIC | Age: 55
End: 2025-02-17
Payer: MEDICAID

## 2025-02-26 DIAGNOSIS — Z72.0 TOBACCO ABUSE: ICD-10-CM

## 2025-02-26 PROCEDURE — RXMED WILLOW AMBULATORY MEDICATION CHARGE

## 2025-02-26 RX ORDER — MICONAZOLE NITRATE 2 %
CREAM (GRAM) TOPICAL
Qty: 50 EACH | Refills: 0 | Status: SHIPPED | OUTPATIENT
Start: 2025-02-26 | End: 2025-03-06 | Stop reason: SDUPTHER

## 2025-02-27 ENCOUNTER — PHARMACY VISIT (OUTPATIENT)
Dept: PHARMACY | Facility: CLINIC | Age: 55
End: 2025-02-27
Payer: MEDICAID

## 2025-03-06 DIAGNOSIS — R06.02 SOB (SHORTNESS OF BREATH): ICD-10-CM

## 2025-03-06 DIAGNOSIS — Z72.0 TOBACCO ABUSE: ICD-10-CM

## 2025-03-06 RX ORDER — MICONAZOLE NITRATE 2 %
CREAM (GRAM) TOPICAL
Qty: 50 EACH | Refills: 0 | Status: SHIPPED | OUTPATIENT
Start: 2025-03-06

## 2025-03-06 RX ORDER — ASPIRIN 81 MG/1
81 TABLET ORAL DAILY
Qty: 30 TABLET | Refills: 0 | Status: SHIPPED | OUTPATIENT
Start: 2025-03-06 | End: 2026-03-06

## 2025-03-06 RX ORDER — ALBUTEROL SULFATE 90 UG/1
2 INHALANT RESPIRATORY (INHALATION) 4 TIMES DAILY
Qty: 6.7 G | Refills: 0 | Status: SHIPPED | OUTPATIENT
Start: 2025-03-06

## 2025-03-07 DIAGNOSIS — I42.0 DILATED CARDIOMYOPATHY (MULTI): Primary | ICD-10-CM

## 2025-03-07 DIAGNOSIS — Z01.818 PREOP TESTING: ICD-10-CM

## 2025-03-07 PROCEDURE — RXMED WILLOW AMBULATORY MEDICATION CHARGE

## 2025-03-08 ENCOUNTER — PHARMACY VISIT (OUTPATIENT)
Dept: PHARMACY | Facility: CLINIC | Age: 55
End: 2025-03-08
Payer: MEDICAID

## 2025-03-09 DIAGNOSIS — Z72.0 TOBACCO ABUSE: ICD-10-CM

## 2025-03-09 DIAGNOSIS — R06.02 SOB (SHORTNESS OF BREATH): ICD-10-CM

## 2025-03-10 ENCOUNTER — ANESTHESIA (OUTPATIENT)
Dept: CARDIOLOGY | Facility: HOSPITAL | Age: 55
End: 2025-03-10
Payer: MEDICAID

## 2025-03-10 ENCOUNTER — ANESTHESIA EVENT (OUTPATIENT)
Dept: CARDIOLOGY | Facility: HOSPITAL | Age: 55
End: 2025-03-10

## 2025-03-10 PROCEDURE — RXMED WILLOW AMBULATORY MEDICATION CHARGE

## 2025-03-10 RX ORDER — ASPIRIN 81 MG/1
81 TABLET ORAL DAILY
Qty: 30 TABLET | Refills: 0 | Status: SHIPPED | OUTPATIENT
Start: 2025-03-10 | End: 2026-03-10

## 2025-03-10 RX ORDER — ALBUTEROL SULFATE 90 UG/1
2 INHALANT RESPIRATORY (INHALATION) 4 TIMES DAILY
Qty: 6.7 G | Refills: 0 | Status: SHIPPED | OUTPATIENT
Start: 2025-03-10

## 2025-03-10 RX ORDER — MICONAZOLE NITRATE 2 %
CREAM (GRAM) TOPICAL
Qty: 50 EACH | Refills: 0 | Status: SHIPPED | OUTPATIENT
Start: 2025-03-10

## 2025-03-16 NOTE — PROGRESS NOTES
Primary Care Physician: Parth Couch MD  Date of Visit: 03/17/2025  3:00 PM EDT  [place] [facility] [address]     Chief Complaint:   No chief complaint on file.    HPI / Summary:   Austin Gould is a 54 y.o. male presents follow-up    ROS    Medical History:   He has a past medical history of Body mass index (BMI) 26.0-26.9, adult (05/22/2020) and Personal history of other mental and behavioral disorders.  Surgical Hx:   He has no past surgical history on file.   Social Hx:   He reports that he quit smoking about 4 years ago. His smoking use included cigarettes. He has quit using smokeless tobacco.  His smokeless tobacco use included chew. He reports that he does not currently use alcohol. He reports that he does not currently use drugs after having used the following drugs: Marijuana.  Family Hx:   His family history is not on file.   Allergies:  No Known Allergies  Outpatient Medications:  Current Outpatient Medications   Medication Instructions    albuterol 90 mcg/actuation inhaler Inhale 2 puffs by mouth 4 times a day.    aspirin 81 mg, oral, Daily    carvedilol (Coreg) 25 mg tablet TAKE 1 TABLET BY MOUTH TWO TIMES A DAY WITH OR AFTER A MEAL    furosemide (Lasix) 40 mg tablet TAKE 1 TABLET BY MOUTH ONE TIME DAILY    nicotine polacrilex (Nicorette) 2 mg gum CHEW ONE PIECE SLOWLY AND INTERMITTENTLY FOR 30 MINUTES. REPEAT EVERY 1 TO 2 HOURS. MAXIMUM OF 24 PIECES/DAY    potassium chloride CR (Klor-Con M20) 20 mEq ER tablet TAKE 1 TABLET BY MOUTH ONE TIME DAILY WITH BREAKFAST AND A FULL GLASS OF WATER    sacubitriL-valsartan (Entresto) 24-26 mg tablet TAKE 1 TABLET BY MOUTH TWO TIMES A DAY    spironolactone (Aldactone) 25 mg tablet TAKE 1 TABLET BY MOUTH ONE TIME DAILY     Physical Exam:  There were no vitals filed for this visit.    Wt Readings from Last 5 Encounters:   02/11/25 83 kg (183 lb)   01/22/25 83.5 kg (184 lb)   12/16/24 85.7 kg (189 lb)   12/11/24 82.1 kg (181 lb)   10/07/24 81.7 kg (180 lb  3 oz)     Physical Exam  JVP not elevated. Carotid impulses are 2+ without overlying bruit.   Chest exhibits fair to good air movement with completely clear breath sounds.   The cardiac rhythm is regular with no premature beats.   Normal S1 and S2. No gallop, murmur or rub, or click.   Abdomen is soft and benign without focal tenderness.   With no lower leg edema. The pedal pulses are intact.     Last Labs:  Office Visit on 01/22/2025   Component Date Value    Ventricular Rate 01/22/2025 101     Atrial Rate 01/22/2025 101     CT Interval 01/22/2025 146     QRS Duration 01/22/2025 106     QT Interval 01/22/2025 370     QTC Calculation(Bazett) 01/22/2025 479     P Axis 01/22/2025 65     R Axis 01/22/2025 -4     T [place] 01/22/2025 160     QRS Count 01/22/2025 17     Q Onset 01/22/2025 217     P Onset 01/22/2025 144     P Offset 01/22/2025 200     T Offset 01/22/2025 402     QTC Fred[place] 01/22/2025 440    Hospital Outpatient Visit on 12/10/2024   Component Date Value    AV pk dariela 12/10/2024 1.34     LVOT diam 12/10/2024 2.40     MV E/A ratio 12/10/2024 15.19     LA vol index A/L 12/10/2024 43.1     Tricuspid annular plane * 12/10/2024 2.6     LV EF 12/10/2024 18     RV free wall pk S' 12/10/2024 14.00     LVIDd 12/10/2024 6.61     RVSP 12/10/2024 28.2     Aortic Valve Area by Con* 12/10/2024 3.42     AV pk grad 12/10/2024 7     LV A4C EF 12/10/2024 23.8         Assessment/Plan   1. Nonischemic congestive cardiomyopathy. This patient does have a background history of hepatitis C, former smoking and a history of alcoholism. He was admitted to [facility] on 05/05/2020 with increasing generalized lower extremity edema and shortness of breath. His initial EKG showed sinus tachycardia with left ventricular hypertrophy and a T-wave inversion abnormality. Echocardiogram demonstrated severe global impairment in left ventricular function with an estimated LV ejection fraction of 20% with 1+ aortic valve insufficiency,  oneâ€“2+ mitral valve regurgitation, moderate to severe left atrial enlargement, moderate right ventricular chamber dilatation, 2â€“3 plus tricuspid valve regurgitation, and severe right atrial enlargement. His labs were notable for elevated transaminases thought to be related to passive hepatic congestion as opposed to his former alcoholism or hepatitis C. His creatinine was 1.1 and he did have a mild thrombocytopenia. The patient had no significant lower extremity edema scrotal edema and even a moderate amount of ascites. The patient was placed on an IV Lasix infusion ultimately was diuresed nearly 50 pounds. The patient had a cardiac catheterization performed both right and left-sided which demonstrated normal coronary arteries with marked left ventricular chamber dilatation and severe global impairment in LV systolic contractility LV ejection fraction of 15â€“20 percent. In addition to the IV Lasix infusion the patient was placed on a combination of carvedilol 25 mg twice a day and losartan 100 mg daily. The doses were gradually uptitrated during his hospitalization and was well tolerated by blood pressure. The patient was also started on spironolactone 25 mg daily. At the time of discharge he was some placed on Lasix 40 mg daily as well. His weight has remained relatively stable so since discharge with relatively mild 1+ residual edema. The patient did have lab work repeated on 05/19/2020 with SMA panel including creatinine of 1.27 potassium of 4.3. Liver function panel was normal except for an ALT of 114. Magnesium level was 2.24. CBC included a WBC of 12,100. Since his last visit on the patient has lost another 6 pounds down to 177 pounds which was his usual weight several years ago. He had repeat lab work on 05/22/2020 which showed complete normalization of his liver function test with stable creatinine of 1.16. The patient is feeling improved. The patient has remained off of smoking and is wearing a  nicotinic patch. He does have home oxygen. His current management will be continued unchanged. The patient's weight is now 191 pounds. Laboratory from 05/22/2020 included a normal SMA panel magnesium level of 2.57. Patient with repeat basic metabolic panel today. Present medications will be kept unchanged. He will have repeat echocardiogram performed at which point a decision would be made as to whether or not he would require a ICD implantation. Repeat echo done 6/3/2022 showed much improvement in EF to 55-60% with mild to moderate aortic valve regurgitation and atrial septal aneurysm..  Patient currently having some mild degree of shortness of breath with occasional panic attacks unable to breathe with some heart fluttering.  He did have a repeat echocardiogram 8/31/2023 which showed deteriorations with moderate LV chamber enlargement and an LV ejection fraction of only 25% and with 1+ aortic valve insufficiency 1+ mitral valve regurgitation moderate to severe left atrial enlargement and pulmonary artery systolic pressure of 34 mmHg.  Question compliance with his medications.  Lab work 6/17/2024 included a normal CMP and CBC with hematocrit 52.6 B12 270 vitamin D 30.  Lipid panel included cholesterol 151 LDL 84 HDL 34 triglyceride 167 in the absence of treatment.  Repeat lab work 8/9/2024 included normal CBC and CMP with creatinine of 1.22.  TSH was 3.04.  Cholesterol was 128 HDL 50.  Patient will be scheduled for repeat echocardiogram medication compliance alcohol avoidance advised.  Return to office 4 months.  Repeat echocardiogram performed 12/10/2024 again demonstrates a markedly reduced LV ejection fraction at 15-20% with 1-2+ aortic insufficiency trace/1+ mitral valve regurgitation trace tricuspid valve regurgitation and atrial septal aneurysm.  The patient has not been consistently compliant with his medications for the nonischemic congestive cardiomyopathy with variable LV ejection fractions as noted  above.  He will be referred to electrophysiology for consideration of a prophylactic ICD.  Medication compliance again emphasized with current treatment plan including carvedilol 25 mg twice daily Entresto 24/26 mg twice daily spironolactone 25 mg daily.  The patient was seen by electrophysiology on 1/22/2025 and the option of a substernal ICD was even discussed.  The procedure was scheduled for 3/17/2025 but the patient canceled it due to the fact that he was feeling improved.  He states that he has been much more compliant with his medications watching his diet getting more rest minimal alcohol.  He is able to do perform activities effortlessly that previously required some degree of struggle.  It is possible that his LV ejection fraction has improved with better compliance with his medications.  He will be continued on current therapy including the carvedilol Entresto spironolactone and repeat echocardiogram at time of next visit in 4 months.      2. Pulmonary vein thrombosis. The patient have a CT angiogram performed during his admission which demonstrated a rare thrombosis of one of the pulmonary veins for which the patient is currently on Xarelto 20 mg daily. Will most likely discontinue the Xarelto in approximately 3 months pending further clinical follow-up in the review with his repeat echocardiogram.     3. History of hepatitis C. The patient evidently did complete his total course of treatment supervised by [facility] urology at [facility].     4. History of alcohol excess.     5. History of chronic smoking. The patient has been a former smoker of 2 packs per day but does not some that have not since his hospital discharge.     6. History of ADHD.         Orders:  No orders of the defined types were placed in this encounter.     Followup Appts:  Future Appointments   Date Time Provider Department Center   3/17/2025  3:00 PM Kenan Echevarria MD [id number] [facility]            ____________________________________________________________  MD [facility] [place]  Assistant Clinical Professor, [facility]

## 2025-03-17 ENCOUNTER — PHARMACY VISIT (OUTPATIENT)
Dept: PHARMACY | Facility: CLINIC | Age: 55
End: 2025-03-17
Payer: MEDICAID

## 2025-03-17 ENCOUNTER — OFFICE VISIT (OUTPATIENT)
Dept: CARDIOLOGY | Facility: CLINIC | Age: 55
End: 2025-03-17
Payer: MEDICAID

## 2025-03-17 VITALS
WEIGHT: 190.7 LBS | HEART RATE: 86 BPM | SYSTOLIC BLOOD PRESSURE: 124 MMHG | DIASTOLIC BLOOD PRESSURE: 70 MMHG | BODY MASS INDEX: 27.3 KG/M2 | OXYGEN SATURATION: 98 % | HEIGHT: 70 IN

## 2025-03-17 DIAGNOSIS — I42.0 DILATED CARDIOMYOPATHY (MULTI): Primary | ICD-10-CM

## 2025-03-17 PROCEDURE — 3008F BODY MASS INDEX DOCD: CPT | Performed by: INTERNAL MEDICINE

## 2025-03-17 PROCEDURE — 99213 OFFICE O/P EST LOW 20 MIN: CPT | Performed by: INTERNAL MEDICINE

## 2025-03-17 PROCEDURE — 1036F TOBACCO NON-USER: CPT | Performed by: INTERNAL MEDICINE

## 2025-03-17 ASSESSMENT — PATIENT HEALTH QUESTIONNAIRE - PHQ9
SUM OF ALL RESPONSES TO PHQ9 QUESTIONS 1 AND 2: 0
2. FEELING DOWN, DEPRESSED OR HOPELESS: NOT AT ALL
1. LITTLE INTEREST OR PLEASURE IN DOING THINGS: NOT AT ALL

## 2025-03-17 ASSESSMENT — PAIN SCALES - GENERAL: PAINLEVEL_OUTOF10: 0-NO PAIN

## 2025-03-21 DIAGNOSIS — Z01.818 PREOP TESTING: ICD-10-CM

## 2025-03-27 DIAGNOSIS — Z72.0 TOBACCO ABUSE: ICD-10-CM

## 2025-03-27 RX ORDER — MICONAZOLE NITRATE 2 %
CREAM (GRAM) TOPICAL
Qty: 50 EACH | Refills: 0 | Status: SHIPPED | OUTPATIENT
Start: 2025-03-27

## 2025-03-28 ENCOUNTER — PHARMACY VISIT (OUTPATIENT)
Dept: PHARMACY | Facility: CLINIC | Age: 55
End: 2025-03-28
Payer: MEDICAID

## 2025-03-28 PROCEDURE — RXMED WILLOW AMBULATORY MEDICATION CHARGE

## 2025-04-11 ENCOUNTER — APPOINTMENT (OUTPATIENT)
Dept: CARDIOLOGY | Facility: HOSPITAL | Age: 55
End: 2025-04-11
Payer: MEDICAID

## 2025-04-16 DIAGNOSIS — Z72.0 TOBACCO ABUSE: ICD-10-CM

## 2025-04-16 DIAGNOSIS — R06.02 SOB (SHORTNESS OF BREATH): ICD-10-CM

## 2025-04-16 PROCEDURE — RXMED WILLOW AMBULATORY MEDICATION CHARGE

## 2025-04-16 RX ORDER — CARVEDILOL 25 MG/1
25 TABLET ORAL
Qty: 60 TABLET | Refills: 0 | Status: SHIPPED | OUTPATIENT
Start: 2025-04-16 | End: 2026-04-16

## 2025-04-16 RX ORDER — MICONAZOLE NITRATE 2 %
CREAM (GRAM) TOPICAL
Qty: 50 EACH | Refills: 0 | Status: SHIPPED | OUTPATIENT
Start: 2025-04-16

## 2025-04-16 RX ORDER — FUROSEMIDE 40 MG/1
40 TABLET ORAL DAILY
Qty: 30 TABLET | Refills: 0 | Status: SHIPPED | OUTPATIENT
Start: 2025-04-16 | End: 2026-04-16

## 2025-04-16 RX ORDER — SPIRONOLACTONE 25 MG/1
25 TABLET ORAL DAILY
Qty: 30 TABLET | Refills: 0 | Status: SHIPPED | OUTPATIENT
Start: 2025-04-16 | End: 2026-04-16

## 2025-04-16 RX ORDER — SACUBITRIL AND VALSARTAN 24; 26 MG/1; MG/1
1 TABLET, FILM COATED ORAL 2 TIMES DAILY
Qty: 60 TABLET | Refills: 6 | Status: SHIPPED | OUTPATIENT
Start: 2025-04-16 | End: 2026-04-16

## 2025-04-17 ENCOUNTER — PHARMACY VISIT (OUTPATIENT)
Dept: PHARMACY | Facility: CLINIC | Age: 55
End: 2025-04-17
Payer: MEDICAID

## 2025-04-30 DIAGNOSIS — Z72.0 TOBACCO ABUSE: ICD-10-CM

## 2025-04-30 RX ORDER — MICONAZOLE NITRATE 2 %
CREAM (GRAM) TOPICAL
Qty: 50 EACH | Refills: 0 | Status: SHIPPED | OUTPATIENT
Start: 2025-04-30

## 2025-05-01 PROCEDURE — RXMED WILLOW AMBULATORY MEDICATION CHARGE

## 2025-05-03 ENCOUNTER — PHARMACY VISIT (OUTPATIENT)
Dept: PHARMACY | Facility: CLINIC | Age: 55
End: 2025-05-03
Payer: MEDICAID

## 2025-05-12 ENCOUNTER — PHARMACY VISIT (OUTPATIENT)
Dept: PHARMACY | Facility: CLINIC | Age: 55
End: 2025-05-12
Payer: MEDICAID

## 2025-05-12 DIAGNOSIS — Z72.0 TOBACCO ABUSE: ICD-10-CM

## 2025-05-12 PROCEDURE — RXMED WILLOW AMBULATORY MEDICATION CHARGE

## 2025-05-12 RX ORDER — MICONAZOLE NITRATE 2 %
CREAM (GRAM) TOPICAL
Qty: 50 EACH | Refills: 0 | Status: SHIPPED | OUTPATIENT
Start: 2025-05-12

## 2025-05-30 DIAGNOSIS — Z72.0 TOBACCO ABUSE: ICD-10-CM

## 2025-05-30 PROCEDURE — RXMED WILLOW AMBULATORY MEDICATION CHARGE

## 2025-05-30 RX ORDER — MICONAZOLE NITRATE 2 %
CREAM (GRAM) TOPICAL
Qty: 50 EACH | Refills: 0 | Status: SHIPPED | OUTPATIENT
Start: 2025-05-30

## 2025-06-03 ENCOUNTER — PHARMACY VISIT (OUTPATIENT)
Dept: PHARMACY | Facility: CLINIC | Age: 55
End: 2025-06-03
Payer: MEDICAID

## 2025-06-14 DIAGNOSIS — R06.02 SOB (SHORTNESS OF BREATH): ICD-10-CM

## 2025-06-14 DIAGNOSIS — Z72.0 TOBACCO ABUSE: ICD-10-CM

## 2025-06-15 PROCEDURE — RXMED WILLOW AMBULATORY MEDICATION CHARGE

## 2025-06-16 ENCOUNTER — PHARMACY VISIT (OUTPATIENT)
Dept: PHARMACY | Facility: CLINIC | Age: 55
End: 2025-06-16
Payer: MEDICAID

## 2025-06-16 PROCEDURE — RXMED WILLOW AMBULATORY MEDICATION CHARGE

## 2025-06-16 RX ORDER — POTASSIUM CHLORIDE 20 MEQ/1
TABLET, EXTENDED RELEASE ORAL
Qty: 30 TABLET | Refills: 6 | Status: SHIPPED | OUTPATIENT
Start: 2025-06-16 | End: 2026-06-16

## 2025-06-16 RX ORDER — SPIRONOLACTONE 25 MG/1
25 TABLET ORAL DAILY
Qty: 30 TABLET | Refills: 0 | Status: SHIPPED | OUTPATIENT
Start: 2025-06-16 | End: 2026-06-16

## 2025-06-16 RX ORDER — FUROSEMIDE 40 MG/1
40 TABLET ORAL DAILY
Qty: 30 TABLET | Refills: 0 | Status: SHIPPED | OUTPATIENT
Start: 2025-06-16 | End: 2026-06-16

## 2025-06-16 RX ORDER — ASPIRIN 81 MG/1
81 TABLET ORAL DAILY
Qty: 30 TABLET | Refills: 0 | Status: SHIPPED | OUTPATIENT
Start: 2025-06-16 | End: 2026-06-16

## 2025-06-16 RX ORDER — CARVEDILOL 25 MG/1
25 TABLET ORAL
Qty: 60 TABLET | Refills: 0 | Status: SHIPPED | OUTPATIENT
Start: 2025-06-16 | End: 2026-06-16

## 2025-06-16 RX ORDER — MICONAZOLE NITRATE 2 %
CREAM (GRAM) TOPICAL
Qty: 50 EACH | Refills: 0 | Status: SHIPPED | OUTPATIENT
Start: 2025-06-16

## 2025-06-16 RX ORDER — ALBUTEROL SULFATE 90 UG/1
2 INHALANT RESPIRATORY (INHALATION) 4 TIMES DAILY
Qty: 6.7 G | Refills: 0 | Status: SHIPPED | OUTPATIENT
Start: 2025-06-16

## 2025-06-18 ENCOUNTER — PHARMACY VISIT (OUTPATIENT)
Dept: PHARMACY | Facility: CLINIC | Age: 55
End: 2025-06-18
Payer: MEDICAID

## 2025-06-28 DIAGNOSIS — R06.02 SOB (SHORTNESS OF BREATH): ICD-10-CM

## 2025-06-28 DIAGNOSIS — Z72.0 TOBACCO ABUSE: ICD-10-CM

## 2025-06-30 RX ORDER — CARVEDILOL 25 MG/1
25 TABLET ORAL
Qty: 60 TABLET | Refills: 0 | OUTPATIENT
Start: 2025-06-30 | End: 2026-06-30

## 2025-06-30 RX ORDER — ALBUTEROL SULFATE 90 UG/1
2 INHALANT RESPIRATORY (INHALATION) 4 TIMES DAILY
Qty: 6.7 G | Refills: 0 | OUTPATIENT
Start: 2025-06-30

## 2025-06-30 RX ORDER — ASPIRIN 81 MG/1
81 TABLET ORAL DAILY
Qty: 30 TABLET | Refills: 0 | OUTPATIENT
Start: 2025-06-30 | End: 2026-06-30

## 2025-06-30 RX ORDER — FUROSEMIDE 40 MG/1
40 TABLET ORAL DAILY
Qty: 30 TABLET | Refills: 0 | OUTPATIENT
Start: 2025-06-30 | End: 2026-06-30

## 2025-06-30 RX ORDER — SPIRONOLACTONE 25 MG/1
25 TABLET ORAL DAILY
Qty: 30 TABLET | Refills: 0 | OUTPATIENT
Start: 2025-06-30 | End: 2026-06-30

## 2025-06-30 RX ORDER — MICONAZOLE NITRATE 2 %
CREAM (GRAM) TOPICAL
Qty: 50 EACH | Refills: 0 | OUTPATIENT
Start: 2025-06-30

## 2025-07-02 DIAGNOSIS — Z72.0 TOBACCO ABUSE: ICD-10-CM

## 2025-07-02 PROCEDURE — RXMED WILLOW AMBULATORY MEDICATION CHARGE

## 2025-07-02 RX ORDER — MICONAZOLE NITRATE 2 %
CREAM (GRAM) TOPICAL
Qty: 50 EACH | Refills: 0 | Status: SHIPPED | OUTPATIENT
Start: 2025-07-02 | End: 2025-07-11 | Stop reason: SDUPTHER

## 2025-07-07 ENCOUNTER — PHARMACY VISIT (OUTPATIENT)
Dept: PHARMACY | Facility: CLINIC | Age: 55
End: 2025-07-07
Payer: MEDICAID

## 2025-07-11 ENCOUNTER — PHARMACY VISIT (OUTPATIENT)
Dept: PHARMACY | Facility: CLINIC | Age: 55
End: 2025-07-11
Payer: MEDICAID

## 2025-07-11 DIAGNOSIS — R06.02 SOB (SHORTNESS OF BREATH): ICD-10-CM

## 2025-07-11 DIAGNOSIS — Z72.0 TOBACCO ABUSE: ICD-10-CM

## 2025-07-11 PROCEDURE — RXMED WILLOW AMBULATORY MEDICATION CHARGE

## 2025-07-11 RX ORDER — SPIRONOLACTONE 25 MG/1
25 TABLET ORAL DAILY
Qty: 30 TABLET | Refills: 0 | Status: SHIPPED | OUTPATIENT
Start: 2025-07-11 | End: 2026-07-11

## 2025-07-11 RX ORDER — ALBUTEROL SULFATE 90 UG/1
2 INHALANT RESPIRATORY (INHALATION) 4 TIMES DAILY
Qty: 6.7 G | Refills: 0 | Status: SHIPPED | OUTPATIENT
Start: 2025-07-11

## 2025-07-11 RX ORDER — CARVEDILOL 25 MG/1
25 TABLET ORAL
Qty: 60 TABLET | Refills: 0 | Status: SHIPPED | OUTPATIENT
Start: 2025-07-11 | End: 2026-07-11

## 2025-07-11 RX ORDER — MICONAZOLE NITRATE 2 %
CREAM (GRAM) TOPICAL
Qty: 50 EACH | Refills: 0 | Status: SHIPPED | OUTPATIENT
Start: 2025-07-11

## 2025-07-11 RX ORDER — FUROSEMIDE 40 MG/1
40 TABLET ORAL DAILY
Qty: 30 TABLET | Refills: 0 | Status: SHIPPED | OUTPATIENT
Start: 2025-07-11 | End: 2026-07-11

## 2025-07-12 PROCEDURE — RXMED WILLOW AMBULATORY MEDICATION CHARGE

## 2025-07-21 ENCOUNTER — OFFICE VISIT (OUTPATIENT)
Dept: CARDIOLOGY | Facility: CLINIC | Age: 55
End: 2025-07-21
Payer: MEDICAID

## 2025-07-21 ENCOUNTER — HOSPITAL ENCOUNTER (OUTPATIENT)
Dept: CARDIOLOGY | Facility: CLINIC | Age: 55
Discharge: HOME | End: 2025-07-21
Payer: MEDICAID

## 2025-07-21 VITALS
WEIGHT: 181.8 LBS | OXYGEN SATURATION: 96 % | SYSTOLIC BLOOD PRESSURE: 126 MMHG | DIASTOLIC BLOOD PRESSURE: 84 MMHG | HEART RATE: 104 BPM | HEIGHT: 70 IN | BODY MASS INDEX: 26.03 KG/M2

## 2025-07-21 DIAGNOSIS — I42.0 DILATED CARDIOMYOPATHY (MULTI): ICD-10-CM

## 2025-07-21 DIAGNOSIS — R94.31 ABNORMAL EKG: Primary | ICD-10-CM

## 2025-07-21 DIAGNOSIS — I10 HYPERTENSION, UNSPECIFIED TYPE: ICD-10-CM

## 2025-07-21 DIAGNOSIS — E78.5 HYPERLIPIDEMIA, UNSPECIFIED HYPERLIPIDEMIA TYPE: ICD-10-CM

## 2025-07-21 LAB
AORTIC VALVE MEAN GRADIENT: 3 MMHG
AORTIC VALVE PEAK VELOCITY: 1.28 M/S
AV PEAK GRADIENT: 7 MMHG
AVA (PEAK VEL): 4.2 CM2
AVA (VTI): 3.88 CM2
EJECTION FRACTION APICAL 4 CHAMBER: 37.4
EJECTION FRACTION: 23 %
LEFT ATRIUM VOLUME AREA LENGTH INDEX BSA: 63.8 ML/M2
LEFT VENTRICLE INTERNAL DIMENSION DIASTOLE: 7.73 CM (ref 3.5–6)
LEFT VENTRICULAR OUTFLOW TRACT DIAMETER: 2.74 CM
MITRAL VALVE E/A RATIO: 31.05
RIGHT VENTRICLE FREE WALL PEAK S': 16 CM/S
TRICUSPID ANNULAR PLANE SYSTOLIC EXCURSION: 2.7 CM

## 2025-07-21 PROCEDURE — 3074F SYST BP LT 130 MM HG: CPT | Performed by: INTERNAL MEDICINE

## 2025-07-21 PROCEDURE — 93306 TTE W/DOPPLER COMPLETE: CPT

## 2025-07-21 PROCEDURE — 3078F DIAST BP <80 MM HG: CPT | Performed by: INTERNAL MEDICINE

## 2025-07-21 PROCEDURE — 99214 OFFICE O/P EST MOD 30 MIN: CPT | Performed by: INTERNAL MEDICINE

## 2025-07-21 PROCEDURE — RXMED WILLOW AMBULATORY MEDICATION CHARGE

## 2025-07-21 PROCEDURE — 3008F BODY MASS INDEX DOCD: CPT | Performed by: INTERNAL MEDICINE

## 2025-07-21 PROCEDURE — 99212 OFFICE O/P EST SF 10 MIN: CPT | Mod: 25

## 2025-07-21 PROCEDURE — 93306 TTE W/DOPPLER COMPLETE: CPT | Performed by: INTERNAL MEDICINE

## 2025-07-21 ASSESSMENT — PAIN SCALES - GENERAL: PAINLEVEL_OUTOF10: 0-NO PAIN

## 2025-07-21 NOTE — PROGRESS NOTES
Primary Care Physician: Parth Couch MD  Date of Visit: 07/21/2025  4:15 PM EDT  Location of visit: [facility] [address]     Chief Complaint:   Chief Complaint   Patient presents with    Follow-up     HPI / Summary:   Austin Gould is a 55 y.o. male presents follow-up    ROS    Medical History:   He has a past medical history of Body mass index (BMI) 26.0-26.9, adult (05/22/2020) and Personal history of other mental and behavioral disorders.  Surgical Hx:   He has no past surgical history on file.   Social Hx:   He reports that he quit smoking about 5 years ago. His smoking use included cigarettes. He has quit using smokeless tobacco.  His smokeless tobacco use included chew. He reports that he does not currently use alcohol. He reports that he does not currently use drugs after having used the following drugs: Marijuana.  Family Hx:   His family history is not on file.   Allergies:  No Known Allergies  Outpatient Medications:  Current Outpatient Medications   Medication Instructions    albuterol 90 mcg/actuation inhaler Inhale 2 puffs by mouth 4 times a day.    aspirin 81 mg, oral, Daily    carvedilol (Coreg) 25 mg tablet TAKE 1 TABLET BY MOUTH TWO TIMES A DAY WITH OR AFTER A MEAL    furosemide (Lasix) 40 mg tablet TAKE 1 TABLET BY MOUTH ONE TIME DAILY    nicotine polacrilex (Nicorette) 2 mg gum CHEW ONE PIECE SLOWLY AND INTERMITTENTLY FOR 30 MINUTES. REPEAT EVERY 1 TO 2 HOURS. MAXIMUM OF 24 PIECES/DAY    potassium chloride CR (Klor-Con M20) 20 mEq ER tablet TAKE 1 TABLET BY MOUTH ONE TIME DAILY WITH BREAKFAST AND A FULL GLASS OF WATER    sacubitriL-valsartan (Entresto) 24-26 mg tablet TAKE 1 TABLET BY MOUTH TWO TIMES A DAY    spironolactone (Aldactone) 25 mg tablet TAKE 1 TABLET BY MOUTH ONE TIME DAILY     Physical Exam:  Vitals:    07/21/25 1615   BP: 121/78   BP Location: Left arm   Patient Position: Sitting   BP Cuff Size: Large adult   Pulse: 95   SpO2: 96%   Weight: 82.5 kg (181 lb 12.8 oz)   Height: 1.778  "m (5' 10\")       Wt Readings from Last 5 Encounters:   07/21/25 82.5 kg (181 lb 12.8 oz)   03/17/25 86.5 kg (190 lb 11.2 oz)   02/11/25 83 kg (183 lb)   01/22/25 83.5 kg (184 lb)   12/16/24 85.7 kg (189 lb)     Physical Exam  JVP not elevated. Carotid impulses are 2+ without overlying bruit.   Chest exhibits fair to good air movement with completely clear breath sounds.   The cardiac rhythm is regular with no premature beats.   Normal S1 and S2. No gallop, murmur or rub, or click.   Abdomen is soft and benign without focal tenderness.   With no lower leg edema. The pedal pulses are intact.     Last Labs:  Hospital Outpatient Visit on 07/21/2025   Component Date Value    BSA 07/21/2025 2.02    Office Visit on 01/22/2025   Component Date Value    Ventricular Rate 01/22/2025 101     Atrial Rate 01/22/2025 101     [place] Interval 01/22/2025 146     QRS Duration 01/22/2025 106     QT Interval 01/22/2025 370     QTC Calculation(Bazett) 01/22/2025 479     P Axis 01/22/2025 65     R Axis 01/22/2025 -4     T [place] 01/22/2025 160     QRS Count 01/22/2025 17     Q Onset 01/22/2025 217     P Onset 01/22/2025 144     P Offset 01/22/2025 200     T Offset 01/22/2025 402     QTC Fredericia 01/22/2025 440         Assessment/Plan   1. Nonischemic congestive cardiomyopathy. This patient does have a background history of hepatitis C, former smoking and a history of alcoholism. He was admitted to [facility] on 05/05/2020 with increasing generalized lower extremity edema and shortness of breath. His initial EKG showed sinus tachycardia with left ventricular hypertrophy and a T-wave inversion abnormality. Echocardiogram demonstrated severe global impairment in left ventricular function with an estimated LV ejection fraction of 20% with 1+ aortic valve insufficiency, oneâ€“2+ mitral valve regurgitation, moderate to severe left atrial enlargement, moderate right ventricular chamber dilatation, 2â€“3 plus tricuspid valve regurgitation, and " severe right atrial enlargement. His labs were notable for elevated transaminases thought to be related to passive hepatic congestion as opposed to his former alcoholism or hepatitis C. His creatinine was 1.1 and he did have a mild thrombocytopenia. The patient had no significant lower extremity edema scrotal edema and even a moderate amount of ascites. The patient was placed on an IV Lasix infusion ultimately was diuresed nearly 50 pounds. The patient had a cardiac catheterization performed both right and left-sided which demonstrated normal coronary arteries with marked left ventricular chamber dilatation and severe global impairment in LV systolic contractility LV ejection fraction of 15â€“20 percent. In addition to the IV Lasix infusion the patient was placed on a combination of carvedilol 25 mg twice a day and losartan 100 mg daily. The doses were gradually uptitrated during his hospitalization and was well tolerated by blood pressure. The patient was also started on spironolactone 25 mg daily. At the time of discharge he was some placed on Lasix 40 mg daily as well. His weight has remained relatively stable so since discharge with relatively mild 1+ residual edema. The patient did have lab work repeated on 05/19/2020 with SMA panel including creatinine of 1.27 potassium of 4.3. Liver function panel was normal except for an ALT of 114. Magnesium level was 2.24. CBC included a WBC of 12,100. Since his last visit on the patient has lost another 6 pounds down to 177 pounds which was his usual weight several years ago. He had repeat lab work on 05/22/2020 which showed complete normalization of his liver function test with stable creatinine of 1.16. The patient is feeling improved. The patient has remained off of smoking and is wearing a nicotinic patch. He does have home oxygen. His current management will be continued unchanged. The patient's weight is now 191 pounds. Laboratory from 05/22/2020 included a normal  SMA panel magnesium level of 2.57. Patient with repeat basic metabolic panel today. Present medications will be kept unchanged. He will have repeat echocardiogram performed at which point a decision would be made as to whether or not he would require a ICD implantation. Repeat echo done 6/3/2022 showed much improvement in EF to 55-60% with mild to moderate aortic valve regurgitation and atrial septal aneurysm..  Patient currently having some mild degree of shortness of breath with occasional panic attacks unable to breathe with some heart fluttering.  He did have a repeat echocardiogram 8/31/2023 which showed deteriorations with moderate LV chamber enlargement and an LV ejection fraction of only 25% and with 1+ aortic valve insufficiency 1+ mitral valve regurgitation moderate to severe left atrial enlargement and pulmonary artery systolic pressure of 34 mmHg.  Question compliance with his medications.  Lab work 6/17/2024 included a normal CMP and CBC with hematocrit 52.6 B12 270 vitamin D 30.  Lipid panel included cholesterol 151 LDL 84 HDL 34 triglyceride 167 in the absence of treatment.  Repeat lab work 8/9/2024 included normal CBC and CMP with creatinine of 1.22.  TSH was 3.04.  Cholesterol was 128 HDL 50.  Patient will be scheduled for repeat echocardiogram medication compliance alcohol avoidance advised.  Return to office 4 months.  Repeat echocardiogram performed 12/10/2024 again demonstrates a markedly reduced LV ejection fraction at 15-20% with 1-2+ aortic insufficiency trace/1+ mitral valve regurgitation trace tricuspid valve regurgitation and atrial septal aneurysm.  The patient has not been consistently compliant with his medications for the nonischemic congestive cardiomyopathy with variable LV ejection fractions as noted above.  He will be referred to electrophysiology for consideration of a prophylactic ICD.  Medication compliance again emphasized with current treatment plan including carvedilol 25 mg  twice daily Entresto 24/26 mg twice daily spironolactone 25 mg daily.  The patient was seen by electrophysiology on 1/22/2025 and the option of a substernal ICD was even discussed.  The procedure was scheduled for 3/17/2025 but the patient canceled it due to the fact that he was feeling improved.  He states that he has been much more compliant with his medications watching his diet getting more rest minimal alcohol.  He is able to do perform activities effortlessly that previously required some degree of struggle.  It is possible that his LV ejection fraction has improved with better compliance with his medications.  He will be continued on current therapy including the carvedilol Entresto spironolactone and repeat echocardiogram at time of next visit in 4 months.  At time of office visit 7/21/2025 patient clinically is doing fairly well denies shortness of breath except in the hot humid weather.  He has minimal ankle edema no sock rings.  The patient did have a echocardiogram performed 12/10/2024 which demonstrated a significantly reduced LV ejection fraction of 15 to 20% with global hypokinesis  And severe LV chamber dilatation.  There is also mild to moderate left atrial enlargement trace to mild mitral valve regurgitation mild to moderate aortic valve insufficiency trace tricuspid valve regurgitation.  EKG 1/22/2025 shows sinus tachycardia left and right atrial enlargement left ventricular hypertrophy and QRS widening with repolarization abnormality.  The patient had been referred to electrophysiology for  ICD implantation which was scheduled for 3/17/2025.  Patient unfortunately decided not to have the procedure performed.   2. Pulmonary vein thrombosis. The patient have a CT angiogram performed during his admission which demonstrated a rare thrombosis of one of the pulmonary veins for which the patient is currently on Xarelto 20 mg daily. Will most likely discontinue the Xarelto in approximately 3 months  pending further clinical follow-up in the review with his repeat echocardiogram.  The repeat echocardiogram performed 7/21/2025 reviewed with patient again demonstrates severe LV chamber dilatation with an ejection fraction still of only 20-25% with 1-2+ mitral valve regurgitation 1+ aortic valve insufficiency.  Will increase Entresto from 24/26 mg twice daily to 49/51 mg twice daily.  Potentially may be able to increase carvedilol as well.  Will check lab work including CBC CMP lipid panel and TSH.  Patient referred back to electrophysiology for reconsideration of an ICD implant.     3. History of hepatitis C. The patient evidently did complete his total course of treatment supervised by gastric urology at [facility].     4. History of alcohol excess.     5. History of chronic smoking. The patient has been a former smoker of 2 packs per day but does not some that have not since his hospital discharge.     6. History of ADHD.         Orders:  No orders of the defined types were placed in this encounter.     Followup Appts:  No future appointments.          ____________________________________________________________  Kenan Echevarria MD  [facility] [place]  Assistant Clinical Professor, [employer]  [facility]

## 2025-07-22 ENCOUNTER — PHARMACY VISIT (OUTPATIENT)
Dept: PHARMACY | Facility: CLINIC | Age: 55
End: 2025-07-22
Payer: MEDICAID

## 2025-08-11 ENCOUNTER — PHARMACY VISIT (OUTPATIENT)
Dept: PHARMACY | Facility: CLINIC | Age: 55
End: 2025-08-11
Payer: MEDICAID

## 2025-08-11 DIAGNOSIS — Z72.0 TOBACCO ABUSE: ICD-10-CM

## 2025-08-11 DIAGNOSIS — R06.02 SOB (SHORTNESS OF BREATH): ICD-10-CM

## 2025-08-11 PROCEDURE — RXMED WILLOW AMBULATORY MEDICATION CHARGE

## 2025-08-11 RX ORDER — MICONAZOLE NITRATE 2 %
CREAM (GRAM) TOPICAL
Qty: 50 EACH | Refills: 0 | Status: CANCELLED | OUTPATIENT
Start: 2025-08-11

## 2025-08-12 PROCEDURE — RXMED WILLOW AMBULATORY MEDICATION CHARGE

## 2025-08-12 RX ORDER — CARVEDILOL 25 MG/1
25 TABLET ORAL
Qty: 60 TABLET | Refills: 0 | Status: SHIPPED | OUTPATIENT
Start: 2025-08-12 | End: 2026-08-12

## 2025-08-12 RX ORDER — FUROSEMIDE 40 MG/1
40 TABLET ORAL DAILY
Qty: 30 TABLET | Refills: 0 | Status: SHIPPED | OUTPATIENT
Start: 2025-08-12 | End: 2026-08-12

## 2025-08-12 RX ORDER — ALBUTEROL SULFATE 90 UG/1
2 INHALANT RESPIRATORY (INHALATION) 4 TIMES DAILY
Qty: 6.7 G | Refills: 0 | Status: SHIPPED | OUTPATIENT
Start: 2025-08-12

## 2025-08-12 RX ORDER — ASPIRIN 81 MG/1
81 TABLET ORAL DAILY
Qty: 30 TABLET | Refills: 0 | Status: SHIPPED | OUTPATIENT
Start: 2025-08-12 | End: 2026-08-12

## 2025-08-12 RX ORDER — SPIRONOLACTONE 25 MG/1
25 TABLET ORAL DAILY
Qty: 30 TABLET | Refills: 0 | Status: SHIPPED | OUTPATIENT
Start: 2025-08-12 | End: 2026-08-12

## 2025-08-12 RX ORDER — MICONAZOLE NITRATE 2 %
CREAM (GRAM) TOPICAL
Qty: 50 EACH | Refills: 0 | Status: SHIPPED | OUTPATIENT
Start: 2025-08-12 | End: 2025-08-21 | Stop reason: SDUPTHER

## 2025-08-13 ENCOUNTER — PHARMACY VISIT (OUTPATIENT)
Dept: PHARMACY | Facility: CLINIC | Age: 55
End: 2025-08-13
Payer: MEDICAID

## 2025-08-21 ENCOUNTER — PHARMACY VISIT (OUTPATIENT)
Dept: PHARMACY | Facility: CLINIC | Age: 55
End: 2025-08-21
Payer: MEDICAID

## 2025-08-21 DIAGNOSIS — Z72.0 TOBACCO ABUSE: ICD-10-CM

## 2025-08-21 PROCEDURE — RXMED WILLOW AMBULATORY MEDICATION CHARGE

## 2025-08-21 RX ORDER — MICONAZOLE NITRATE 2 %
CREAM (GRAM) TOPICAL
Qty: 50 EACH | Refills: 0 | Status: SHIPPED | OUTPATIENT
Start: 2025-08-21 | End: 2025-08-30 | Stop reason: SDUPTHER

## 2025-08-27 ENCOUNTER — TELEPHONE (OUTPATIENT)
Dept: CARDIOLOGY | Facility: CLINIC | Age: 55
End: 2025-08-27
Payer: MEDICAID

## 2025-08-29 DIAGNOSIS — Z72.0 TOBACCO ABUSE: ICD-10-CM

## 2025-08-29 RX ORDER — MICONAZOLE NITRATE 2 %
CREAM (GRAM) TOPICAL
Qty: 50 EACH | Refills: 0 | OUTPATIENT
Start: 2025-08-29

## 2025-08-30 ENCOUNTER — PHARMACY VISIT (OUTPATIENT)
Dept: PHARMACY | Facility: CLINIC | Age: 55
End: 2025-08-30
Payer: MEDICAID

## 2025-08-30 ENCOUNTER — OFFICE VISIT (OUTPATIENT)
Dept: PRIMARY CARE | Facility: CLINIC | Age: 55
End: 2025-08-30
Payer: MEDICAID

## 2025-08-30 DIAGNOSIS — R06.02 SOB (SHORTNESS OF BREATH): ICD-10-CM

## 2025-08-30 PROCEDURE — RXMED WILLOW AMBULATORY MEDICATION CHARGE

## 2025-08-30 RX ORDER — FUROSEMIDE 40 MG/1
40 TABLET ORAL DAILY
Qty: 30 TABLET | Refills: 0 | Status: SHIPPED | OUTPATIENT
Start: 2025-08-30 | End: 2026-08-30

## 2025-08-30 RX ORDER — SPIRONOLACTONE 25 MG/1
25 TABLET ORAL DAILY
Qty: 30 TABLET | Refills: 0 | Status: SHIPPED | OUTPATIENT
Start: 2025-08-30 | End: 2026-08-30

## 2025-08-30 RX ORDER — ALBUTEROL SULFATE 90 UG/1
2 INHALANT RESPIRATORY (INHALATION) 4 TIMES DAILY
Qty: 6.7 G | Refills: 0 | Status: SHIPPED | OUTPATIENT
Start: 2025-08-30

## 2025-08-30 RX ORDER — CARVEDILOL 25 MG/1
25 TABLET ORAL
Qty: 60 TABLET | Refills: 0 | Status: SHIPPED | OUTPATIENT
Start: 2025-08-30 | End: 2026-08-30

## 2025-08-30 RX ORDER — ASPIRIN 81 MG/1
81 TABLET ORAL DAILY
Qty: 30 TABLET | Refills: 0 | Status: SHIPPED | OUTPATIENT
Start: 2025-08-30 | End: 2026-08-30

## 2025-09-03 ENCOUNTER — PHARMACY VISIT (OUTPATIENT)
Dept: PHARMACY | Facility: CLINIC | Age: 55
End: 2025-09-03
Payer: MEDICAID

## 2025-09-03 ENCOUNTER — HOSPITAL ENCOUNTER (OUTPATIENT)
Dept: RADIOLOGY | Facility: CLINIC | Age: 55
Discharge: HOME | End: 2025-09-03
Payer: MEDICAID

## 2025-09-03 DIAGNOSIS — I26.99 PULMONARY EMBOLISM, OTHER, UNSPECIFIED CHRONICITY, UNSPECIFIED WHETHER ACUTE COR PULMONALE PRESENT (MULTI): ICD-10-CM

## 2025-09-03 PROCEDURE — 71046 X-RAY EXAM CHEST 2 VIEWS: CPT

## 2025-09-03 PROCEDURE — 71046 X-RAY EXAM CHEST 2 VIEWS: CPT | Performed by: RADIOLOGY

## 2025-09-03 PROCEDURE — RXMED WILLOW AMBULATORY MEDICATION CHARGE
